# Patient Record
Sex: FEMALE | Race: WHITE | NOT HISPANIC OR LATINO | Employment: FULL TIME | URBAN - METROPOLITAN AREA
[De-identification: names, ages, dates, MRNs, and addresses within clinical notes are randomized per-mention and may not be internally consistent; named-entity substitution may affect disease eponyms.]

---
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
## 2020-01-16 ENCOUNTER — OFFICE VISIT (OUTPATIENT)
Dept: INTERNAL MEDICINE | Facility: CLINIC | Age: 20
End: 2020-01-16
Payer: COMMERCIAL

## 2020-01-16 VITALS
SYSTOLIC BLOOD PRESSURE: 130 MMHG | HEIGHT: 66 IN | WEIGHT: 162 LBS | TEMPERATURE: 98.4 F | DIASTOLIC BLOOD PRESSURE: 82 MMHG | HEART RATE: 92 BPM | RESPIRATION RATE: 18 BRPM | OXYGEN SATURATION: 99 % | BODY MASS INDEX: 26.03 KG/M2

## 2020-01-16 DIAGNOSIS — B00.9 HERPES SIMPLEX VIRUS INFECTION: ICD-10-CM

## 2020-01-16 DIAGNOSIS — F41.9 ANXIETY: ICD-10-CM

## 2020-01-16 DIAGNOSIS — Z30.8 ENCOUNTER FOR OTHER CONTRACEPTIVE MANAGEMENT: ICD-10-CM

## 2020-01-16 DIAGNOSIS — R10.13 EPIGASTRIC PAIN: ICD-10-CM

## 2020-01-16 DIAGNOSIS — K21.9 GASTROESOPHAGEAL REFLUX DISEASE WITHOUT ESOPHAGITIS: ICD-10-CM

## 2020-01-16 DIAGNOSIS — F32.0 MILD MAJOR DEPRESSION (H): ICD-10-CM

## 2020-01-16 DIAGNOSIS — L30.8 OTHER ECZEMA: ICD-10-CM

## 2020-01-16 DIAGNOSIS — Z00.00 ENCOUNTER FOR ROUTINE ADULT HEALTH EXAMINATION WITHOUT ABNORMAL FINDINGS: Primary | ICD-10-CM

## 2020-01-16 LAB
ALBUMIN SERPL-MCNC: 4 G/DL (ref 3.4–5)
ALP SERPL-CCNC: 43 U/L (ref 40–150)
ALT SERPL W P-5'-P-CCNC: 37 U/L (ref 0–50)
ANION GAP SERPL CALCULATED.3IONS-SCNC: 9 MMOL/L (ref 3–14)
AST SERPL W P-5'-P-CCNC: 20 U/L (ref 0–35)
BASOPHILS # BLD AUTO: 0 10E9/L (ref 0–0.2)
BASOPHILS NFR BLD AUTO: 0.6 %
BILIRUB SERPL-MCNC: 0.4 MG/DL (ref 0.2–1.3)
BUN SERPL-MCNC: 10 MG/DL (ref 7–30)
CALCIUM SERPL-MCNC: 9.5 MG/DL (ref 8.5–10.1)
CHLORIDE SERPL-SCNC: 106 MMOL/L (ref 96–110)
CHOLEST SERPL-MCNC: 165 MG/DL
CO2 SERPL-SCNC: 25 MMOL/L (ref 20–32)
CREAT SERPL-MCNC: 0.65 MG/DL (ref 0.5–1)
DEPRECATED CALCIDIOL+CALCIFEROL SERPL-MC: 37 UG/L (ref 20–75)
DIFFERENTIAL METHOD BLD: ABNORMAL
EOSINOPHIL # BLD AUTO: 0.1 10E9/L (ref 0–0.7)
EOSINOPHIL NFR BLD AUTO: 1.6 %
ERYTHROCYTE [DISTWIDTH] IN BLOOD BY AUTOMATED COUNT: 13.1 % (ref 10–15)
GFR SERPL CREATININE-BSD FRML MDRD: >90 ML/MIN/{1.73_M2}
GLUCOSE SERPL-MCNC: 93 MG/DL (ref 70–99)
HCT VFR BLD AUTO: 39.3 % (ref 35–47)
HDLC SERPL-MCNC: 52 MG/DL
HGB BLD-MCNC: 12.2 G/DL (ref 11.7–15.7)
LDLC SERPL CALC-MCNC: 97 MG/DL
LYMPHOCYTES # BLD AUTO: 1.8 10E9/L (ref 0.8–5.3)
LYMPHOCYTES NFR BLD AUTO: 25 %
MCH RBC QN AUTO: 27.4 PG (ref 26.5–33)
MCHC RBC AUTO-ENTMCNC: 31 G/DL (ref 31.5–36.5)
MCV RBC AUTO: 88 FL (ref 78–100)
MONOCYTES # BLD AUTO: 0.8 10E9/L (ref 0–1.3)
MONOCYTES NFR BLD AUTO: 10.9 %
NEUTROPHILS # BLD AUTO: 4.3 10E9/L (ref 1.6–8.3)
NEUTROPHILS NFR BLD AUTO: 61.9 %
NONHDLC SERPL-MCNC: 113 MG/DL
PLATELET # BLD AUTO: 342 10E9/L (ref 150–450)
POTASSIUM SERPL-SCNC: 3.8 MMOL/L (ref 3.4–5.3)
PROT SERPL-MCNC: 7.7 G/DL (ref 6.8–8.8)
RBC # BLD AUTO: 4.45 10E12/L (ref 3.8–5.2)
SODIUM SERPL-SCNC: 140 MMOL/L (ref 133–144)
TRIGL SERPL-MCNC: 78 MG/DL
TSH SERPL DL<=0.005 MIU/L-ACNC: 2.3 MU/L (ref 0.4–4)
WBC # BLD AUTO: 7 10E9/L (ref 4–11)

## 2020-01-16 PROCEDURE — 82306 VITAMIN D 25 HYDROXY: CPT | Performed by: NURSE PRACTITIONER

## 2020-01-16 PROCEDURE — 99385 PREV VISIT NEW AGE 18-39: CPT | Performed by: NURSE PRACTITIONER

## 2020-01-16 PROCEDURE — 80061 LIPID PANEL: CPT | Performed by: NURSE PRACTITIONER

## 2020-01-16 PROCEDURE — 36415 COLL VENOUS BLD VENIPUNCTURE: CPT | Performed by: NURSE PRACTITIONER

## 2020-01-16 PROCEDURE — 80050 GENERAL HEALTH PANEL: CPT | Performed by: NURSE PRACTITIONER

## 2020-01-16 RX ORDER — DESOGESTREL AND ETHINYL ESTRADIOL 0.15-0.03
1 KIT ORAL DAILY
Qty: 84 TABLET | Refills: 3 | Status: SHIPPED | OUTPATIENT
Start: 2020-01-16 | End: 2020-12-18

## 2020-01-16 RX ORDER — VALACYCLOVIR HYDROCHLORIDE 500 MG/1
500 TABLET, FILM COATED ORAL 2 TIMES DAILY
Qty: 6 TABLET | Refills: 4 | Status: SHIPPED | OUTPATIENT
Start: 2020-01-16 | End: 2021-05-10

## 2020-01-16 ASSESSMENT — ENCOUNTER SYMPTOMS
DIZZINESS: 0
DIARRHEA: 0
MYALGIAS: 0
BREAST MASS: 0
WEAKNESS: 0
CHILLS: 0
EYE PAIN: 0
DYSURIA: 0
HEARTBURN: 0
COUGH: 0
CONSTIPATION: 0
FREQUENCY: 0
PARESTHESIAS: 0
HEMATURIA: 0
NERVOUS/ANXIOUS: 1
JOINT SWELLING: 0
HEMATOCHEZIA: 0
ARTHRALGIAS: 0
SHORTNESS OF BREATH: 0
FEVER: 0
SORE THROAT: 1
NAUSEA: 0
PALPITATIONS: 0
ABDOMINAL PAIN: 0
HEADACHES: 1

## 2020-01-16 ASSESSMENT — MIFFLIN-ST. JEOR: SCORE: 1522.61

## 2020-01-16 NOTE — PATIENT INSTRUCTIONS
Lab in [address]    Hydrocortisone 1% to area of rash    Cover with moisturizing cream   VANICREAM or CERAVE    TUMS as needed     Omeprazole once daily for 14 days     Valtrex as needed   Prescription at pharmacy     Prescription for BCP

## 2020-01-16 NOTE — PROGRESS NOTES
SUBJECTIVE:   CC: Yolanda Tenorio is an 19 year old woman who presents for preventive health visit.     Healthy Habits:     Getting at least 3 servings of Calcium per day:  Yes    Bi-annual eye exam:  Yes    Dental care twice a year:  Yes    Sleep apnea or symptoms of sleep apnea:  None    Diet:  Regular (no restrictions)    Frequency of exercise:  1 day/week    Duration of exercise:  15-30 minutes    Taking medications regularly:  Yes    Medication side effects:  None    PHQ-2 Total Score: 2    Additional concerns today:  Yes              Today's PHQ-2 Score:   PHQ-2 ( 1999 Pfizer) 1/16/2020   Q1: Little interest or pleasure in doing things 1   Q2: Feeling down, depressed or hopeless 1   PHQ-2 Score 2   Q1: Little interest or pleasure in doing things Several days   Q2: Feeling down, depressed or hopeless Several days   PHQ-2 Score 2       Abuse: Current or Past(Physical, Sexual or Emotional)- No  Do you feel safe in your environment? Yes        Social History     Tobacco Use     Smoking status: Never Smoker     Smokeless tobacco: Never Used   Substance Use Topics     Alcohol use: No         Alcohol Use 1/16/2020   Prescreen: >3 drinks/day or >7 drinks/week? No   Prescreen: >3 drinks/day or >7 drinks/week? -       Reviewed orders with patient.  Reviewed health maintenance and updated orders accordingly - Yes          Pertinent mammograms are reviewed under the imaging tab.  History of abnormal Pap smear: NO - under age 21, PAP not appropriate for age     Reviewed and updated as needed this visit by clinical staff  Tobacco  Allergies  Meds  Problems  Med Hx  Surg Hx  Fam Hx         Reviewed and updated as needed this visit by Provider  Tobacco  Allergies  Meds  Problems  Med Hx  Surg Hx  Fam Hx            Review of Systems   Constitutional: Negative for chills and fever.   HENT: Negative for congestion, ear pain and hearing loss.    Eyes: Negative for pain.   Respiratory: Negative for cough.   "  Cardiovascular: Negative for chest pain, palpitations and peripheral edema.   Gastrointestinal: Negative for abdominal pain, constipation, diarrhea, heartburn, hematochezia and nausea.   Genitourinary: Negative for dysuria, frequency, genital sores, hematuria, pelvic pain, urgency and vaginal bleeding.   Musculoskeletal: Negative for arthralgias, joint swelling and myalgias.   Neurological: Positive for headaches. Negative for dizziness and paresthesias.   Psychiatric/Behavioral: The patient is nervous/anxious.      Headaches since she was 9 years - 1-2 times a week - sometimes migraines or headaches   Tylenol helps   Sit in dark room - light sensitive, nausea at times     Valtrex for herpes - has not had out break for 2 years    Denies any STD testing     Seeing therapist for anxiety and depression - starting with counselor    Stomach issue     Leg rash - eczema right leg        OBJECTIVE:   /82   Pulse 92   Temp 98.4  F (36.9  C) (Oral)   Resp 18   Ht 1.67 m (5' 5.75\")   Wt 73.5 kg (162 lb)   LMP 01/15/2020 (Exact Date)   SpO2 99%   Breastfeeding No   BMI 26.35 kg/m    Physical Exam  GENERAL:  alert and no distress  EYES: Eyes grossly normal to inspection,  and conjunctivae and sclerae normal  HENT: ear canals and TM's normal, nose and mouth without ulcers or lesions  NECK: no adenopathy, no asymmetry, masses, or scars and thyroid normal to palpation  RESP: lungs clear to auscultation - no rales, rhonchi or wheezes  CV: regular rate and rhythm, normal S1 S2, no S3 or S4, no murmur, click or rub, no peripheral edema and peripheral pulses strong  ABDOMEN: soft, epigastric tender, no hepatosplenomegaly, no masses and bowel sounds normal  MS: no gross musculoskeletal defects noted, no edema  SKIN: circular dry flaky spot right leg   NEURO: Normal strength and tone, mentation intact and speech normal  PSYCH: mentation appears normal, affect normal/bright    Diagnostic Test Results:  Labs reviewed in " "[facility]  Lab     ASSESSMENT/PLAN:   1. Encounter for routine adult health examination without abnormal findings    - CBC with platelets and differential  - Comprehensive metabolic panel  - TSH with free T4 reflex  - Lipid panel reflex to direct LDL Fasting    2. Herpes simplex virus infection  Refill prn - has not had outbreak for 2 years    - valACYclovir (VALTREX) 500 MG tablet; Take 1 tablet (500 mg) by mouth 2 times daily for 3 days  Dispense: 6 tablet; Refill: 4    3. Anxiety  Seeing counselor   Discussed medication and she is not wanting at this time   - CBC with platelets and differential  - Comprehensive metabolic panel  - TSH with free T4 reflex  - Vitamin D Deficiency    4. Mild major depression (H)  As above   - CBC with platelets and differential  - Comprehensive metabolic panel  - TSH with free T4 reflex  - Vitamin D Deficiency    5. Encounter for other contraceptive management  Stable on medication   - desogestrel-ethinyl estradiol (APRI) 0.15-30 MG-MCG tablet; Take 1 tablet by mouth daily  Dispense: 84 tablet; Refill: 3    6. Other eczema  Nummular eczema on right leg     7. Gastroesophageal reflux disease without esophagitis  Omeprazole for 2 weeks and TUMS prn     8. Epigastric pain  As above     Patient Instructions   Lab in [address]    Hydrocortisone 1% to area of rash    Cover with moisturizing cream   VANICREAM or CERAVE    TUMS as needed     Omeprazole once daily for 14 days     Valtrex as needed   Prescription at pharmacy     Prescription for BCP           COUNSELING:  Reviewed preventive health counseling, as reflected in patient instructions       Regular exercise       Healthy diet/nutrition       Osteoporosis Prevention/Bone Health    Estimated body mass index is 26.35 kg/m  as calculated from the following:    Height as of this encounter: 1.67 m (5' 5.75\").    Weight as of this encounter: 73.5 kg (162 lb).         reports that she has never smoked. She has never used smokeless " tobacco.      Counseling Resources:  ATP IV Guidelines  Pooled Cohorts Equation Calculator  Breast Cancer Risk Calculator  FRAX Risk Assessment  ICSI Preventive Guidelines  Dietary Guidelines for Americans, 2010  USDA's MyPlate  ASA Prophylaxis  Lung CA Screening    GEOVANNA Herring [facility]

## 2020-01-16 NOTE — NURSING NOTE
"Chief Complaint   Patient presents with     Physical     initial /82   Pulse 92   Temp 98.4  F (36.9  C) (Oral)   Resp 18   Ht 1.67 m (5' 5.75\")   Wt 73.5 kg (162 lb)   LMP 01/15/2020 (Exact Date)   SpO2 99%   Breastfeeding No   BMI 26.35 kg/m   Estimated body mass index is 26.35 kg/m  as calculated from the following:    Height as of this encounter: 1.67 m (5' 5.75\").    Weight as of this encounter: 73.5 kg (162 lb)..  bp completed using cuff size regular  ZEYAD HAWK LPN  "

## 2020-01-17 DIAGNOSIS — Z30.8 ENCOUNTER FOR OTHER CONTRACEPTIVE MANAGEMENT: ICD-10-CM

## 2020-01-17 RX ORDER — DESOGESTREL AND ETHINYL ESTRADIOL 0.15-0.03
KIT ORAL
Qty: 84 TABLET | Refills: 0 | OUTPATIENT
Start: 2020-01-17

## 2020-11-29 ENCOUNTER — HEALTH MAINTENANCE LETTER (OUTPATIENT)
Age: 20
End: 2020-11-29

## 2020-12-03 ENCOUNTER — MYC MEDICAL ADVICE (OUTPATIENT)
Dept: PEDIATRICS | Facility: CLINIC | Age: 20
End: 2020-12-03

## 2020-12-03 NOTE — TELEPHONE ENCOUNTER
Please advise if you are able to complete paper work for an emotional support animal for patient.  Liza Mohan RN

## 2020-12-04 NOTE — TELEPHONE ENCOUNTER
Usually the person who is treating you, and usually psych would fill out any form  I am not treating her for this so cannot fill out any form   She might want to re discuss with psych

## 2020-12-07 ENCOUNTER — VIRTUAL VISIT (OUTPATIENT)
Dept: INTERNAL MEDICINE | Facility: CLINIC | Age: 20
End: 2020-12-07
Payer: COMMERCIAL

## 2020-12-07 VITALS — WEIGHT: 170 LBS | HEIGHT: 66 IN | BODY MASS INDEX: 27.32 KG/M2

## 2020-12-07 DIAGNOSIS — F32.0 MILD MAJOR DEPRESSION (H): Primary | ICD-10-CM

## 2020-12-07 PROCEDURE — 99207 PR NO BILLABLE SERVICE THIS VISIT: CPT | Mod: 25 | Performed by: INTERNAL MEDICINE

## 2020-12-07 RX ORDER — SERTRALINE HYDROCHLORIDE 100 MG/1
100 TABLET, FILM COATED ORAL DAILY
COMMUNITY
End: 2021-04-26 | Stop reason: SINTOL

## 2020-12-07 ASSESSMENT — MIFFLIN-ST. JEOR: SCORE: 1553.89

## 2020-12-07 NOTE — PROGRESS NOTES
"Yolanda Tenorio is a 20 year old female who is being evaluated via a billable video visit.      The patient has been notified of following:     \"This video visit will be conducted via a call between you and your physician/provider. We have found that certain health care needs can be provided without the need for an in-person physical exam.  This service lets us provide the care you need with a video conversation.  If a prescription is necessary we can send it directly to your pharmacy.  If lab work is needed we can place an order for that and you can then stop by our lab to have the test done at a later time.    Video visits are billed at different rates depending on your insurance coverage.  Please reach out to your insurance provider with any questions.    If during the course of the call the physician/provider feels a video visit is not appropriate, you will not be charged for this service.\"    Patient has given verbal consent for Video visit? Yes  How would you like to obtain your AVS? MyChart  If you are dropped from the video visit, the video invite should be resent to: Text to cell phone: [phone]  Will anyone else be joining your video visit? No    Subjective     Yolanda Tenorio is a 20 year old female who presents today via video visit for the following health issues: patient would like a letter to her apartment property stating her need for an emotional support .    HPI   I called and discussed briefly.     She sees psychiatrist for depression and she was told its against their clinic policy to give the need for emotional support animal letter.    Review of Systems   See above      Objective     Vitals:  No vitals were obtained today due to virtual visit.    Physical Exam   \"GENERAL: Healthy, alert and no distress\",\"EYES: Eyes grossly normal to inspection.  No discharge or erythema, or obvious scleral/conjunctival abnormalities.\",\"RESP: No audible wheeze, cough, or visible cyanosis.  No visible retractions " "or increased work of breathing.  \",\"SKIN: Visible skin clear. No significant rash, abnormal pigmentation or lesions.\",\"NEURO: Cranial nerves grossly intact.  Mentation and speech appropriate for age.\",\"PSYCH: Mentation appears normal, affect normal/bright, judgement and insight intact, normal speech and appearance well-groomed.\"        Assessment & Plan     Yolanda was seen today for letter for school/work.    Diagnoses and all orders for this visit:    Mild major depression (H)    I usually don't give letter like this and get opinion for psychiatrist to decide the need.   She is already seening psychiatrist.  Will not charge for the visit.     Patti Sampson MD  [facility]            "

## 2020-12-17 DIAGNOSIS — Z30.8 ENCOUNTER FOR OTHER CONTRACEPTIVE MANAGEMENT: ICD-10-CM

## 2020-12-18 ENCOUNTER — TRANSFERRED RECORDS (OUTPATIENT)
Dept: HEALTH INFORMATION MANAGEMENT | Facility: CLINIC | Age: 20
End: 2020-12-18

## 2020-12-18 RX ORDER — DESOGESTREL AND ETHINYL ESTRADIOL 0.15-0.03
KIT ORAL
Qty: 84 TABLET | Refills: 3 | Status: SHIPPED | OUTPATIENT
Start: 2020-12-18 | End: 2021-04-26

## 2020-12-18 NOTE — TELEPHONE ENCOUNTER
Prescription approved per [facility] Refill Protocol.  Gavin Berrios RN, BSN

## 2021-01-11 DIAGNOSIS — F32.9 MAJOR DEPRESSION, SINGLE EPISODE: Primary | ICD-10-CM

## 2021-01-20 DIAGNOSIS — F32.9 MAJOR DEPRESSION, SINGLE EPISODE: ICD-10-CM

## 2021-01-20 LAB
BASOPHILS # BLD AUTO: 0 10E9/L (ref 0–0.2)
BASOPHILS NFR BLD AUTO: 0.3 %
DEPRECATED CALCIDIOL+CALCIFEROL SERPL-MC: 33 UG/L (ref 20–75)
DIFFERENTIAL METHOD BLD: NORMAL
EOSINOPHIL # BLD AUTO: 0.1 10E9/L (ref 0–0.7)
EOSINOPHIL NFR BLD AUTO: 1.3 %
ERYTHROCYTE [DISTWIDTH] IN BLOOD BY AUTOMATED COUNT: 13.9 % (ref 10–15)
HCT VFR BLD AUTO: 37.8 % (ref 35–47)
HGB BLD-MCNC: 12 G/DL (ref 11.7–15.7)
LYMPHOCYTES # BLD AUTO: 1.5 10E9/L (ref 0.8–5.3)
LYMPHOCYTES NFR BLD AUTO: 19.2 %
MCH RBC QN AUTO: 28.1 PG (ref 26.5–33)
MCHC RBC AUTO-ENTMCNC: 31.7 G/DL (ref 31.5–36.5)
MCV RBC AUTO: 89 FL (ref 78–100)
MONOCYTES # BLD AUTO: 0.6 10E9/L (ref 0–1.3)
MONOCYTES NFR BLD AUTO: 7 %
NEUTROPHILS # BLD AUTO: 5.7 10E9/L (ref 1.6–8.3)
NEUTROPHILS NFR BLD AUTO: 72.2 %
PLATELET # BLD AUTO: 314 10E9/L (ref 150–450)
RBC # BLD AUTO: 4.27 10E12/L (ref 3.8–5.2)
WBC # BLD AUTO: 7.8 10E9/L (ref 4–11)

## 2021-01-20 PROCEDURE — 84460 ALANINE AMINO (ALT) (SGPT): CPT | Performed by: NURSE PRACTITIONER

## 2021-01-20 PROCEDURE — 82247 BILIRUBIN TOTAL: CPT | Performed by: NURSE PRACTITIONER

## 2021-01-20 PROCEDURE — 36415 COLL VENOUS BLD VENIPUNCTURE: CPT | Performed by: NURSE PRACTITIONER

## 2021-01-20 PROCEDURE — 82248 BILIRUBIN DIRECT: CPT | Performed by: NURSE PRACTITIONER

## 2021-01-20 PROCEDURE — 83550 IRON BINDING TEST: CPT | Performed by: NURSE PRACTITIONER

## 2021-01-20 PROCEDURE — 82306 VITAMIN D 25 HYDROXY: CPT | Performed by: NURSE PRACTITIONER

## 2021-01-20 PROCEDURE — 84155 ASSAY OF PROTEIN SERUM: CPT | Performed by: NURSE PRACTITIONER

## 2021-01-20 PROCEDURE — 84450 TRANSFERASE (AST) (SGOT): CPT | Performed by: NURSE PRACTITIONER

## 2021-01-20 PROCEDURE — 84443 ASSAY THYROID STIM HORMONE: CPT | Performed by: NURSE PRACTITIONER

## 2021-01-20 PROCEDURE — 84075 ASSAY ALKALINE PHOSPHATASE: CPT | Performed by: NURSE PRACTITIONER

## 2021-01-20 PROCEDURE — 83540 ASSAY OF IRON: CPT | Performed by: NURSE PRACTITIONER

## 2021-01-20 PROCEDURE — 85025 COMPLETE CBC W/AUTO DIFF WBC: CPT | Performed by: NURSE PRACTITIONER

## 2021-01-20 PROCEDURE — 80069 RENAL FUNCTION PANEL: CPT | Performed by: NURSE PRACTITIONER

## 2021-01-21 LAB
ALBUMIN SERPL-MCNC: 3.7 G/DL (ref 3.4–5)
ALP SERPL-CCNC: 46 U/L (ref 40–150)
ALT SERPL W P-5'-P-CCNC: 34 U/L (ref 0–50)
ANION GAP SERPL CALCULATED.3IONS-SCNC: 7 MMOL/L (ref 3–14)
AST SERPL W P-5'-P-CCNC: 18 U/L (ref 0–45)
BILIRUB DIRECT SERPL-MCNC: 0.1 MG/DL (ref 0–0.2)
BILIRUB SERPL-MCNC: 0.4 MG/DL (ref 0.2–1.3)
BUN SERPL-MCNC: 12 MG/DL (ref 7–30)
CALCIUM SERPL-MCNC: 8.8 MG/DL (ref 8.5–10.1)
CHLORIDE SERPL-SCNC: 110 MMOL/L (ref 94–109)
CO2 SERPL-SCNC: 24 MMOL/L (ref 20–32)
CREAT SERPL-MCNC: 0.63 MG/DL (ref 0.52–1.04)
GFR SERPL CREATININE-BSD FRML MDRD: >90 ML/MIN/{1.73_M2}
GLUCOSE SERPL-MCNC: 82 MG/DL (ref 70–99)
IRON SATN MFR SERPL: 9 % (ref 15–46)
IRON SERPL-MCNC: 43 UG/DL (ref 35–180)
PHOSPHATE SERPL-MCNC: 3.2 MG/DL (ref 2.5–4.5)
POTASSIUM SERPL-SCNC: 4.2 MMOL/L (ref 3.4–5.3)
PROT SERPL-MCNC: 7.4 G/DL (ref 6.8–8.8)
SODIUM SERPL-SCNC: 141 MMOL/L (ref 133–144)
TIBC SERPL-MCNC: 463 UG/DL (ref 240–430)
TSH SERPL DL<=0.005 MIU/L-ACNC: 1.42 MU/L (ref 0.4–4)

## 2021-04-10 ENCOUNTER — HEALTH MAINTENANCE LETTER (OUTPATIENT)
Age: 21
End: 2021-04-10

## 2021-04-26 ENCOUNTER — OFFICE VISIT (OUTPATIENT)
Dept: INTERNAL MEDICINE | Facility: CLINIC | Age: 21
End: 2021-04-26
Payer: COMMERCIAL

## 2021-04-26 VITALS
TEMPERATURE: 98.1 F | WEIGHT: 177 LBS | BODY MASS INDEX: 28.45 KG/M2 | DIASTOLIC BLOOD PRESSURE: 72 MMHG | HEART RATE: 98 BPM | HEIGHT: 66 IN | RESPIRATION RATE: 14 BRPM | OXYGEN SATURATION: 98 % | SYSTOLIC BLOOD PRESSURE: 128 MMHG

## 2021-04-26 DIAGNOSIS — Z30.8 ENCOUNTER FOR OTHER CONTRACEPTIVE MANAGEMENT: ICD-10-CM

## 2021-04-26 DIAGNOSIS — F32.0 MILD MAJOR DEPRESSION (H): Primary | ICD-10-CM

## 2021-04-26 DIAGNOSIS — Z23 NEED FOR VACCINATION: ICD-10-CM

## 2021-04-26 PROCEDURE — 96127 BRIEF EMOTIONAL/BEHAV ASSMT: CPT | Performed by: NURSE PRACTITIONER

## 2021-04-26 PROCEDURE — 90714 TD VACC NO PRESV 7 YRS+ IM: CPT | Performed by: NURSE PRACTITIONER

## 2021-04-26 PROCEDURE — 99395 PREV VISIT EST AGE 18-39: CPT | Mod: 25 | Performed by: NURSE PRACTITIONER

## 2021-04-26 PROCEDURE — 90471 IMMUNIZATION ADMIN: CPT | Performed by: NURSE PRACTITIONER

## 2021-04-26 RX ORDER — DESOGESTREL AND ETHINYL ESTRADIOL 0.15-0.03
1 KIT ORAL DAILY
Qty: 84 TABLET | Refills: 3 | Status: SHIPPED | OUTPATIENT
Start: 2021-04-26 | End: 2022-01-10

## 2021-04-26 RX ORDER — VENLAFAXINE HYDROCHLORIDE 75 MG/1
75 CAPSULE, EXTENDED RELEASE ORAL DAILY
Qty: 90 CAPSULE | Refills: 1 | Status: SHIPPED | OUTPATIENT
Start: 2021-04-26 | End: 2021-12-16

## 2021-04-26 ASSESSMENT — ENCOUNTER SYMPTOMS
HEADACHES: 1
DIARRHEA: 0
EYE PAIN: 0
FEVER: 0
HEARTBURN: 0
NERVOUS/ANXIOUS: 1
PALPITATIONS: 0
NAUSEA: 0
MYALGIAS: 0
HEMATOCHEZIA: 0
HEMATURIA: 0
PARESTHESIAS: 0
DIZZINESS: 0
SORE THROAT: 0
CHILLS: 0
DYSURIA: 0
CONSTIPATION: 0
BREAST MASS: 0
WEAKNESS: 0
FREQUENCY: 1
JOINT SWELLING: 0
COUGH: 0
ABDOMINAL PAIN: 0
ARTHRALGIAS: 0
SHORTNESS OF BREATH: 0

## 2021-04-26 ASSESSMENT — ANXIETY QUESTIONNAIRES
7. FEELING AFRAID AS IF SOMETHING AWFUL MIGHT HAPPEN: NOT AT ALL
5. BEING SO RESTLESS THAT IT IS HARD TO SIT STILL: NOT AT ALL
2. NOT BEING ABLE TO STOP OR CONTROL WORRYING: MORE THAN HALF THE DAYS
4. TROUBLE RELAXING: MORE THAN HALF THE DAYS
GAD7 TOTAL SCORE: 9
1. FEELING NERVOUS, ANXIOUS, OR ON EDGE: MORE THAN HALF THE DAYS
3. WORRYING TOO MUCH ABOUT DIFFERENT THINGS: MORE THAN HALF THE DAYS
6. BECOMING EASILY ANNOYED OR IRRITABLE: SEVERAL DAYS
7. FEELING AFRAID AS IF SOMETHING AWFUL MIGHT HAPPEN: NOT AT ALL

## 2021-04-26 ASSESSMENT — PATIENT HEALTH QUESTIONNAIRE - PHQ9
10. IF YOU CHECKED OFF ANY PROBLEMS, HOW DIFFICULT HAVE THESE PROBLEMS MADE IT FOR YOU TO DO YOUR WORK, TAKE CARE OF THINGS AT HOME, OR GET ALONG WITH OTHER PEOPLE: SOMEWHAT DIFFICULT
SUM OF ALL RESPONSES TO PHQ QUESTIONS 1-9: 13
SUM OF ALL RESPONSES TO PHQ QUESTIONS 1-9: 13

## 2021-04-26 ASSESSMENT — MIFFLIN-ST. JEOR: SCORE: 1589.62

## 2021-04-26 NOTE — PROGRESS NOTES
"   SUBJECTIVE:   CC: Yolanda Tenorio is an 20 year old woman who presents for preventive health visit.       Patient has been advised of split billing requirements and indicates understanding: Yes  Healthy Habits:     Getting at least 3 servings of Calcium per day:  Yes    Bi-annual eye exam:  Yes    Dental care twice a year:  NO    Sleep apnea or symptoms of sleep apnea:  Daytime drowsiness    Diet:  Regular (no restrictions)    Frequency of exercise:  2-3 days/week    Duration of exercise:  30-45 minutes    Taking medications regularly:  Yes    Medication side effects:  None    PHQ-2 Total Score: 4    Additional concerns today:  No            Depression and Anxiety Follow-Up    How are you doing with your depression since your last visit? Off med x 2 weeks due to \"side effects\" not feeling well    How are you doing with your anxiety since your last visit?  No change    Are you having other symptoms that might be associated with depression or anxiety? No    Have you had a significant life event? No     Do you have any concerns with your use of alcohol or other drugs? No    Social History     Tobacco Use     Smoking status: Never Smoker     Smokeless tobacco: Never Used   Substance Use Topics     Alcohol use: No     Drug use: No     PHQ 4/26/2021   PHQ-9 Total Score 13   Q9: Thoughts of better off dead/self-harm past 2 weeks Not at all     ERWIN-7 SCORE 4/26/2021   Total Score 9 (mild anxiety)   Total Score 9     Last PHQ-9 4/26/2021   1.  Little interest or pleasure in doing things 2   2.  Feeling down, depressed, or hopeless 1   3.  Trouble falling or staying asleep, or sleeping too much 2   4.  Feeling tired or having little energy 3   5.  Poor appetite or overeating 1   6.  Feeling bad about yourself 1   7.  Trouble concentrating 2   8.  Moving slowly or restless 1   Q9: Thoughts of better off dead/self-harm past 2 weeks 0   PHQ-9 Total Score 13     ERWIN-7  4/26/2021   1. Feeling nervous, anxious, or on edge 2   2. Not " being able to stop or control worrying 2   3. Worrying too much about different things 2   4. Trouble relaxing 2   5. Being so restless that it is hard to sit still 0   6. Becoming easily annoyed or irritable 1   7. Feeling afraid, as if something awful might happen 0   ERWIN-7 Total Score 9       Suicide Assessment Five-step Evaluation and Treatment (SAFE-T)      Today's PHQ-2 Score:   PHQ-2 ( 1999 [employer]) 4/26/2021   Q1: Little interest or pleasure in doing things 2   Q2: Feeling down, depressed or hopeless 2   PHQ-2 Score 4   Q1: Little interest or pleasure in doing things More than half the days   Q2: Feeling down, depressed or hopeless More than half the days   PHQ-2 Score 4       Abuse: Current or Past (Physical, Sexual or Emotional) - No  Do you feel safe in your environment? Yes    Have you ever done Advance Care Planning? (For example, a Health Directive, POLST, or a discussion with a medical provider or your loved ones about your wishes): No, advance care planning information given to patient to review.  Patient plans to discuss their wishes with loved ones or provider.      Social History     Tobacco Use     Smoking status: Never Smoker     Smokeless tobacco: Never Used   Substance Use Topics     Alcohol use: No     If you drink alcohol do you typically have >3 drinks per day or >7 drinks per week? No    Alcohol Use 4/26/2021   Prescreen: >3 drinks/day or >7 drinks/week? Not Applicable   Prescreen: >3 drinks/day or >7 drinks/week? -       Reviewed orders with patient.  Reviewed health maintenance and updated orders accordingly - Yes  BP Readings from Last 3 Encounters:   04/26/21 128/72   01/16/20 130/82   12/06/18 122/82    Wt Readings from Last 3 Encounters:   04/26/21 80.3 kg (177 lb)   12/07/20 77.1 kg (170 lb)   01/16/20 73.5 kg (162 lb) (89 %, Z= 1.22)*     * Growth percentiles are based on CDC (Girls, 2-20 Years) data.                  Patient Active Problem List   Diagnosis     Headache     Anxiety      Mild major depression (H)     Encounter for other contraceptive management     Herpes simplex virus infection     No past surgical history on file.    Social History     Tobacco Use     Smoking status: Never Smoker     Smokeless tobacco: Never Used   Substance Use Topics     Alcohol use: No     Family History   Problem Relation Age of Onset     Family History Negative No family hx of          Current Outpatient Medications   Medication Sig Dispense Refill     Ferrous Sulfate (IRON PO) Take by mouth daily       valACYclovir (VALTREX) 1000 mg tablet Take 1 tablet (1,000 mg) by mouth 2 times daily 20 tablet 0     venlafaxine (EFFEXOR-XR) 75 MG 24 hr capsule Take 1 capsule (75 mg) by mouth daily 90 capsule 1     APRI 0.15-30 MG-MCG tablet TAKE 1 TABLET BY MOUTH EVERY DAY (Patient not taking: Reported on 4/26/2021) 84 tablet 3       Breast Cancer Screening:        History of abnormal Pap smear: NO - under age 21, PAP not appropriate for age     Reviewed and updated as needed this visit by clinical staff   Allergies  Meds              Reviewed and updated as needed this visit by Provider                    Review of Systems   Constitutional: Negative for chills and fever.   HENT: Negative for congestion, ear pain, hearing loss and sore throat.    Eyes: Negative for pain and visual disturbance.   Respiratory: Negative for cough and shortness of breath.    Cardiovascular: Negative for chest pain, palpitations and peripheral edema.   Gastrointestinal: Negative for abdominal pain, constipation, diarrhea, heartburn, hematochezia and nausea.   Breasts:  Negative for tenderness, breast mass and discharge.   Genitourinary: Positive for frequency, urgency and vaginal discharge. Negative for dysuria, genital sores, hematuria, pelvic pain and vaginal bleeding.   Musculoskeletal: Negative for arthralgias, joint swelling and myalgias.   Skin: Negative for rash.   Neurological: Positive for headaches. Negative for dizziness,  "weakness and paresthesias.   Psychiatric/Behavioral: Positive for mood changes. The patient is nervous/anxious.           OBJECTIVE:   Pulse 98   Temp 98.1  F (36.7  C) (Oral)   Resp 14   Ht 1.676 m (5' 6\")   Wt 80.3 kg (177 lb)   SpO2 98%   BMI 28.57 kg/m    Physical Exam  GENERAL: healthy, alert and no distress  EYES: Eyes grossly normal to inspection, PERRL and conjunctivae and sclerae normal  NECK: no adenopathy, no asymmetry, masses, or scars and thyroid normal to palpation  RESP: lungs clear to auscultation - no rales, rhonchi or wheezes  CV: regular rate and rhythm, normal S1 S2, no S3 or S4, no murmur, click or rub, no peripheral edema and peripheral pulses strong  ABDOMEN: soft, nontender, no hepatosplenomegaly, no masses and bowel sounds normal  MS: no gross musculoskeletal defects noted, no edema  PSYCH: mentation appears normal, affect normal/bright        ASSESSMENT/PLAN:       ICD-10-CM    1. Mild major depression (H)  F32.0 venlafaxine (EFFEXOR-XR) 75 MG 24 hr capsule     MENTAL HEALTH REFERRAL  - Adult; Psychiatry; Psychiatry and Psychotherapy (Individual/Couple/Family Therapy); Collaborative Care Psychiatry Service and [facility] Counseling [phone]; Yes; Chronic Mental Health without improvement; Ye...   2. Need for vaccination  Z23 TD PRSERV FREE >=7 YRS ADS IM [[id number]]   3. Encounter for other contraceptive management  Z30.8 desogestrel-ethinyl estradiol (APRI) 0.15-30 MG-MCG tablet       Patient has been advised of split billing requirements and indicates understanding: Yes  COUNSELING:  Reviewed preventive health counseling, as reflected in patient instructions    Estimated body mass index is 28.57 kg/m  as calculated from the following:    Height as of this encounter: 1.676 m (5' 6\").    Weight as of this encounter: 80.3 kg (177 lb).        She reports that she has never smoked. She has never used smokeless tobacco.      Counseling Resources:  ATP IV Guidelines  Pooled " Cohorts Equation Calculator  Breast Cancer Risk Calculator  BRCA-Related Cancer Risk Assessment: FHS-7 Tool  FRAX Risk Assessment  ICSI Preventive Guidelines  Dietary Guidelines for Americans, 2010  [employer]'s MyPlate  ASA Prophylaxis  Lung CA Screening    Dariana Claire NP  [facility]  Answers for HPI/ROS submitted by the patient on 4/26/2021   Annual Exam:  If you checked off any problems, how difficult have these problems made it for you to do your work, take care of things at home, or get along with other people?: Somewhat difficult  PHQ9 TOTAL SCORE: 13  ERWIN 7 TOTAL SCORE: 9

## 2021-04-27 ASSESSMENT — ANXIETY QUESTIONNAIRES: GAD7 TOTAL SCORE: 9

## 2021-04-27 ASSESSMENT — PATIENT HEALTH QUESTIONNAIRE - PHQ9: SUM OF ALL RESPONSES TO PHQ QUESTIONS 1-9: 13

## 2021-05-06 DIAGNOSIS — B00.9 HERPES SIMPLEX VIRUS INFECTION: ICD-10-CM

## 2021-05-07 NOTE — TELEPHONE ENCOUNTER
Pending Prescriptions:                       Disp   Refills    valACYclovir (VALTREX) 500 MG tablet [Phar*6 tabl*4        Sig: TAKE 1 TABLET BY MOUTH 2 TIMES DAILY FOR 3 DAYS    Routing refill request to provider for review/approval because:  A break in medication

## 2021-05-10 RX ORDER — VALACYCLOVIR HYDROCHLORIDE 500 MG/1
TABLET, FILM COATED ORAL
Qty: 6 TABLET | Refills: 4 | Status: SHIPPED | OUTPATIENT
Start: 2021-05-10 | End: 2021-12-20

## 2021-09-25 ENCOUNTER — HEALTH MAINTENANCE LETTER (OUTPATIENT)
Age: 21
End: 2021-09-25

## 2021-10-19 PROBLEM — F32.9 MAJOR DEPRESSION: Status: ACTIVE | Noted: 2020-01-16

## 2021-12-11 ENCOUNTER — HOSPITAL ENCOUNTER (EMERGENCY)
Facility: CLINIC | Age: 21
Discharge: HOME | End: 2021-12-11
Attending: EMERGENCY MEDICINE | Admitting: EMERGENCY MEDICINE
Payer: COMMERCIAL

## 2021-12-11 VITALS
TEMPERATURE: 99.6 F | OXYGEN SATURATION: 99 % | DIASTOLIC BLOOD PRESSURE: 107 MMHG | RESPIRATION RATE: 18 BRPM | HEART RATE: 112 BPM | SYSTOLIC BLOOD PRESSURE: 169 MMHG
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
## 2021-12-11 PROCEDURE — 250N000013 HC RX MED GY IP 250 OP 250 PS 637: Performed by: EMERGENCY MEDICINE

## 2021-12-11 PROCEDURE — 12001 RPR S/N/AX/GEN/TRNK 2.5CM/<: CPT

## 2021-12-11 PROCEDURE — 99283 EMERGENCY DEPT VISIT LOW MDM: CPT

## 2021-12-11 RX ORDER — IBUPROFEN 600 MG/1
600 TABLET, FILM COATED ORAL ONCE
Status: COMPLETED | OUTPATIENT
Start: 2021-12-11 | End: 2021-12-11

## 2021-12-11 RX ORDER — LIDOCAINE HYDROCHLORIDE AND EPINEPHRINE 10; 10 MG/ML; UG/ML
INJECTION, SOLUTION INFILTRATION; PERINEURAL
Status: DISCONTINUED
Start: 2021-12-11 | End: 2021-12-11 | Stop reason: HOSPADM

## 2021-12-11 RX ADMIN — AMOXICILLIN AND CLAVULANATE POTASSIUM 1 TABLET: 875; 125 TABLET, FILM COATED ORAL at 20:45

## 2021-12-11 RX ADMIN — IBUPROFEN 600 MG: 600 TABLET ORAL at 20:45

## 2021-12-12 NOTE — ED PROVIDER NOTES
History     Chief Complaint:  Dog Bite       HPI   Yolanda Tenorio is a 21 year old female who presents with dog bite to the left forearm.  Patient states that her own dog bit her just prior to arrival.  No concern for rabies.  Tetanus 4/20/2021.  No weakness of the hand or wrist.  No concern for broken teeth or foreign body.  Area surrounding is sore however no bony pain.    ROS:  Review of Systems   Positive bite wound, negative anticoagulation, negative fever, negative weakness wrist or hand, positive vague numbness however feels sensation is overall intact.    Allergies:  No Known Allergies     Medications:      desogestrel-ethinyl estradiol (APRI) 0.15-30 MG-MCG tablet  Ferrous Sulfate (IRON PO)  valACYclovir (VALTREX) 1000 mg tablet  valACYclovir (VALTREX) 500 MG tablet  venlafaxine (EFFEXOR-XR) 75 MG 24 hr capsule        Past Medical History:    No past medical history on file.  Patient Active Problem List   Diagnosis     Headache     Anxiety     Mild major depression (H)     Encounter for other contraceptive management     Herpes simplex virus infection        Past Surgical History:    No past surgical history on file.     Family History:    family history is not on file.    Social History:   reports that she has never smoked. She has never used smokeless tobacco. She reports that she does not drink alcohol and does not use drugs.  PCP: No Ref-Primary, Physician     Physical Exam     Patient Vitals for the past 24 hrs:   BP Temp Temp src Pulse Resp SpO2   12/11/21 1936 (!) 169/107 99.6  F (37.6  C) Temporal 112 18 99 %        Physical Exam  Gen: alert, answers questions appropriately  CV: left hand warm and well perfused, 2+ radial pulse  Pulm: normal resp effort  MSK: no tenderness over the shoulder, elbow or hand, wrist, contusion and laceration to forearm, full AROM of fingers and elbow  Skin: full thickness laceration to volar forearm, gaping, through subcutaneous tissue to level of muscle belly but no  evidence of muscle violation or deep structure needed.  Neuro: LUE: 5/5 grasp, 5/5 finger opposition, 5/5 finger adduction, 5/5 wrist flexion, 5/5 wrist extension, 5/5 elbow flexion, 5/5 elbow extension, 5/5 shoulder abduction, sensation intact intact to light tough in radial, median and ulnar nerve distributions        Procedures   Laceration repair performed by Cristine Neal MD  The wound to the left forearm was anesthetized with 5 mL of 1% lidocaine with epinephrine.  Following this, the wound was irrigated with pressure with 1 L of normal saline.  The wound was examined through full active range of motion of the naris joint in a bloodless field there is no evidence of deep structure injury.  Wound was closed with 2 interrupted 3-0 Ethilon sutures.  No complications.    Interventions:  Medications   lidocaine 1% with EPINEPHrine 1:100,000 1 %-1:[id number] injection (has no administration in time range)   ibuprofen (ADVIL/MOTRIN) tablet 600 mg (has no administration in time range)   amoxicillin-clavulanate (AUGMENTIN) 875-125 MG per tablet 1 tablet (has no administration in time range)        Disposition:  Home    Medical Decision Making:  Dog bite with gaping wound.  I wound care included I copious irrigation with 1 L of normal saline.  Following this, the wound was examined through full active range of motion of the nurse joint without evidence of deep structure injury.  Significant surrounding contusion however no bony tenderness to suggest fracture.  No signs of foreign body.  Given deep and gaping nature of wound, loosely closed with 2 sutures.  Discussed risks of benefits of closure.  Discussed infection risk given bite wound.  Augmentin for infection prophylaxis.  Wound care instructions and reasons to return the emergency department including increasing redness pain fever discussed in detail with the patient.  Recommended 3-day wound check primary care follow-up in 10 to 14 days for suture  removal.    Diagnosis:    ICD-10-CM    1. Dog bite, initial encounter  W54.0XXA         Discharge Medications:  New Prescriptions    AMOXICILLIN-CLAVULANATE (AUGMENTIN) 875-125 MG TABLET    Take 1 tablet by mouth 2 times daily for 3 days        12/11/2021   Cristine Neal*        Cristine Neal MD  12/11/21 2035

## 2021-12-12 NOTE — DISCHARGE INSTRUCTIONS
Take ibuprofen 600 mg 3x per day.  This will provide both pain control and fight against inflammation.    Return for increasing pain, redness, swelling or fevers.    Take antibiotics as prescribed.

## 2021-12-16 ENCOUNTER — OFFICE VISIT (OUTPATIENT)
Dept: FAMILY MEDICINE | Facility: CLINIC | Age: 21
End: 2021-12-16
Payer: COMMERCIAL

## 2021-12-16 VITALS
SYSTOLIC BLOOD PRESSURE: 122 MMHG | TEMPERATURE: 98.3 F | HEART RATE: 96 BPM | OXYGEN SATURATION: 98 % | WEIGHT: 169.2 LBS | HEIGHT: 66 IN | BODY MASS INDEX: 27.19 KG/M2 | DIASTOLIC BLOOD PRESSURE: 82 MMHG
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
## 2021-12-16 PROCEDURE — 99213 OFFICE O/P EST LOW 20 MIN: CPT | Performed by: NURSE PRACTITIONER

## 2021-12-16 ASSESSMENT — MIFFLIN-ST. JEOR: SCORE: 1549.24

## 2021-12-16 NOTE — PROGRESS NOTES
"  Assessment & Plan     Dog bite, subsequent encounter  Reedy, still looking red and swollen with purulent drainage. Follow up if continued issues.  - amoxicillin-clavulanate (AUGMENTIN) 875-125 MG tablet  Dispense: 20 tablet; Refill: 0       BMI:   Estimated body mass index is 27.31 kg/m  as calculated from the following:    Height as of this encounter: 1.676 m (5' 6\").    Weight as of this encounter: 76.7 kg (169 lb 3.2 oz).       No follow-ups on file.    Renetta Billings CNP  [facility]    Smith Guerrero is a 21 year old who presents for the following health issues  accompanied by her self.    hospitals     ED/UC Followup:    Facility:  [facility] ED  Date of visit: 12/11/2021  Reason for visit: Dog Bite - LEFT Forearm - Possible Infection?   Current Status: Stable           Review of Systems   Constitutional, HEENT, cardiovascular, pulmonary, GI, , musculoskeletal, neuro, skin, endocrine and psych systems are negative, except as otherwise noted.      Objective    /82 (BP Location: Right arm, Patient Position: Sitting)   Pulse 96   Temp 98.3  F (36.8  C) (Tympanic)   Ht 1.676 m (5' 6\")   Wt 76.7 kg (169 lb 3.2 oz)   SpO2 98%   BMI 27.31 kg/m    Body mass index is 27.31 kg/m .  Physical Exam   GENERAL: healthy, alert and no distress  SKIN: dog bite left forearm, closed with 2 sutures. Purulent drainage and surrounding ecchymosis, swelling, erythema. Erythema extends about 2 cm from bite site.               Renetta Billings, SOWMYAP-C     [address] [address]  [email or web address]  [email or web address]   Office: [phone]                 "

## 2021-12-17 ASSESSMENT — ANXIETY QUESTIONNAIRES
IF YOU CHECKED OFF ANY PROBLEMS ON THIS QUESTIONNAIRE, HOW DIFFICULT HAVE THESE PROBLEMS MADE IT FOR YOU TO DO YOUR WORK, TAKE CARE OF THINGS AT HOME, OR GET ALONG WITH OTHER PEOPLE: SOMEWHAT DIFFICULT
6. BECOMING EASILY ANNOYED OR IRRITABLE: NEARLY EVERY DAY
3. WORRYING TOO MUCH ABOUT DIFFERENT THINGS: MORE THAN HALF THE DAYS
GAD7 TOTAL SCORE: 14
7. FEELING AFRAID AS IF SOMETHING AWFUL MIGHT HAPPEN: NOT AT ALL
5. BEING SO RESTLESS THAT IT IS HARD TO SIT STILL: SEVERAL DAYS
1. FEELING NERVOUS, ANXIOUS, OR ON EDGE: NEARLY EVERY DAY
2. NOT BEING ABLE TO STOP OR CONTROL WORRYING: NEARLY EVERY DAY

## 2021-12-17 ASSESSMENT — PATIENT HEALTH QUESTIONNAIRE - PHQ9
SUM OF ALL RESPONSES TO PHQ QUESTIONS 1-9: 12
5. POOR APPETITE OR OVEREATING: MORE THAN HALF THE DAYS

## 2021-12-18 ASSESSMENT — ANXIETY QUESTIONNAIRES: GAD7 TOTAL SCORE: 14

## 2021-12-21 ENCOUNTER — OFFICE VISIT (OUTPATIENT)
Dept: URGENT CARE | Facility: URGENT CARE | Age: 21
End: 2021-12-21
Payer: COMMERCIAL

## 2021-12-21 VITALS
OXYGEN SATURATION: 100 % | DIASTOLIC BLOOD PRESSURE: 74 MMHG | WEIGHT: 165 LBS | TEMPERATURE: 98.9 F | BODY MASS INDEX: 26.63 KG/M2 | SYSTOLIC BLOOD PRESSURE: 102 MMHG | HEART RATE: 85 BPM

## 2021-12-21 DIAGNOSIS — S41.152D DOG BITE OF ARM, LEFT, SUBSEQUENT ENCOUNTER: ICD-10-CM

## 2021-12-21 DIAGNOSIS — S50.12XA TRAUMATIC HEMATOMA OF FOREARM, LEFT, INITIAL ENCOUNTER: Primary | ICD-10-CM
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
## 2021-12-21 PROCEDURE — 99214 OFFICE O/P EST MOD 30 MIN: CPT | Performed by: PHYSICIAN ASSISTANT

## 2021-12-22 NOTE — PROGRESS NOTES
Assessment & Plan     Traumatic hematoma of forearm, left, initial encounter  Patient reassurance.  Swollen area appears consistent with a hematoma today.  Ultrasound of the left upper extremity is ordered and is pending at this time.  Patient will be notified if any abnormal results other than a hematoma.  Patient educational information is provided.  Follow-up sooner if any worsening symptoms.  She agrees with the plan.  - US Upper Extremity Non Vascular Left    Dog bite of arm, left, subsequent encounter  No evidence of ongoing infection.  Laceration site appears to be healing appropriately.  Continue and finish course of Augmentin.  Follow-up if any worsening symptoms.  She agrees with the plan.  - US Upper Extremity Non Vascular Left         Return in about 1 week (around 12/28/2021) for Symptoms failing to improve.    Marylou Etienne PA-C  [facility] [place]    Smith Guerrero is a 21 year old female who presents to clinic today for the following health issues:  Chief Complaint   Patient presents with     Dog Bite     Dog Bite on 12/11/21  hard swollen buldge feels numb, painful when twisting arm and feel uncomfortable, yellow drainage , washing and applying neosporin and taking meds prescribed        HPI    Patient is presenting to urgent care today with a complaint of a large swollen area over the volar aspect of the left forearm. Ongoing symptoms for the past few days.  She sustained a dog bite to the left forearm approximately 10 days ago.  Was seen in the emergency room, laceration was repaired with 2 sutures, was prescribed 3 days of Augmentin for infection prophylaxis.  She followed up with her primary 5 days ago and prescribed a 10 days course of Augmentin as there was concern for infection.  Today she is coming to urgent care as she is concerned that the infection might not be improving. No recent fevers or chills.  No purulent drainage from the affected area.  She reports  discomfort with pronation or supination of the left forearm.       Review of Systems  Constitutional, HEENT, cardiovascular, pulmonary, GI, , musculoskeletal, neuro, skin, endocrine and psych systems are negative, except as otherwise noted.      Objective    /74   Pulse 85   Temp 98.9  F (37.2  C) (Oral)   Wt 74.8 kg (165 lb)   SpO2 100%   BMI 26.63 kg/m    Physical Exam   GENERAL: healthy, alert and no distress  MS: left volar forearm: There is still healing laceration noted, no purulent drainage, no surrounding erythema, skin does not feel warm to the touch, superiorly to the laceration is an indurated area approximately 3 x 4 cm.  There is no erythema.  Skin does not feel hot to the touch.  No significant tenderness to palpation. ROM of the left forearm is normal.

## 2021-12-22 NOTE — PATIENT INSTRUCTIONS
Please call [phone] to schedule left forearm soft tissue ultrasound.      Patient Education     Hematoma  A hematoma is a collection of blood trapped outside of a blood vessel. It is what we think of as a bruise or a contusion. It is usually seen under the skin as a black and blue spot on your arm or leg, or a bump on your head after an injury. It can be almost anywhere on or in your body. It can also occur in an internal organ where it can be more serious.   A hematoma is caused by an injury with damage to small blood vessels. This causes blood to leak into the tissues. Blood forms a pocket under the skin that swells and looks like a purplish patch. Hematomas sometimes form under the skin from bleeding during childbirth and can be particularly serious. Another serious form of hematoma forms after a fall on the head, called a subdural hematoma.   Gradually the blood in the hematoma is absorbed back into the body. The swelling and pain of the hematoma will go away. This takes from 1 to 4 weeks, depending on the size of the hematoma. The skin over the hematoma may turn bluish then brown and yellow as the blood is dissolved and absorbed. Usually, this only takes a couple of weeks but can last months.   Home care    Limit motion of the joints near the hematoma. If the hematoma is large and painful, avoid sports and other vigorous physical activity until the swelling and pain goes away.    Apply an ice pack (ice cubes in a plastic bag, or a frozen bag of peas, wrapped in a thin towel) over the injured area for 20 minutes every 1 to 2 hours the first day. Continue with ice packs 3 to 4 times a day for the next 2 days. Continue the use of ice packs for relief of pain and swelling as needed.    If you need anything for pain, you can take acetaminophen, unless you were given a different pain medicine to use. Talk with your healthcare provider before using this medicine if you have chronic liver or kidney disease. Also  talk with your healthcare provider if you have had a stomach ulcer or digestive tract bleeding, or are taking blood-thinner medicines.    Follow-up care  Follow up with your healthcare provider, or as advised. If X-rays or a CT scan were done, you will be notified if there is a change in the reading, especially if it affects treatment.   When to seek medical advice  Call your healthcare provider right away if any of the following occur:    Redness around the hematoma    Increase in pain or warmth in the hematoma    Increase in size of the hematoma    Fever of 100.4 F (38 C) or higher, or as directed by your healthcare provider    If the hematoma is on the arm or leg, watch for:  ? Increased swelling or pain in the extremity  ? Numbness or tingling or blue color of the hand or foot  Geraldine last reviewed this educational content on 4/1/2018 2000-2021 [employer]. All rights reserved. This information is not intended as a substitute for professional medical care. Always follow your healthcare professional's instructions.

## 2022-01-10 ENCOUNTER — OFFICE VISIT (OUTPATIENT)
Dept: FAMILY MEDICINE | Facility: CLINIC | Age: 22
End: 2022-01-10
Payer: COMMERCIAL

## 2022-01-10 VITALS
HEART RATE: 75 BPM | WEIGHT: 158.5 LBS | HEIGHT: 66 IN | TEMPERATURE: 97.5 F | SYSTOLIC BLOOD PRESSURE: 120 MMHG | DIASTOLIC BLOOD PRESSURE: 82 MMHG | RESPIRATION RATE: 16 BRPM | BODY MASS INDEX: 25.47 KG/M2 | OXYGEN SATURATION: 100 %

## 2022-01-10 DIAGNOSIS — Z11.3 SCREENING FOR STDS (SEXUALLY TRANSMITTED DISEASES): ICD-10-CM

## 2022-01-10 DIAGNOSIS — Z11.59 NEED FOR HEPATITIS C SCREENING TEST: ICD-10-CM

## 2022-01-10 DIAGNOSIS — Z00.00 ROUTINE GENERAL MEDICAL EXAMINATION AT A HEALTH CARE FACILITY: Primary | ICD-10-CM

## 2022-01-10 DIAGNOSIS — Z30.8 ENCOUNTER FOR OTHER CONTRACEPTIVE MANAGEMENT: ICD-10-CM

## 2022-01-10 DIAGNOSIS — F32.0 CURRENT MILD EPISODE OF MAJOR DEPRESSIVE DISORDER WITHOUT PRIOR EPISODE (H): ICD-10-CM

## 2022-01-10 DIAGNOSIS — Z12.4 SCREENING FOR CERVICAL CANCER: ICD-10-CM

## 2022-01-10 LAB
CLUE CELLS: ABNORMAL
TRICHOMONAS, WET PREP: ABNORMAL
WBC'S/HIGH POWER FIELD, WET PREP: ABNORMAL
YEAST, WET PREP: ABNORMAL

## 2022-01-10 PROCEDURE — G0145 SCR C/V CYTO,THINLAYER,RESCR: HCPCS | Performed by: FAMILY MEDICINE

## 2022-01-10 PROCEDURE — 36415 COLL VENOUS BLD VENIPUNCTURE: CPT | Performed by: FAMILY MEDICINE

## 2022-01-10 PROCEDURE — 86780 TREPONEMA PALLIDUM: CPT | Performed by: FAMILY MEDICINE

## 2022-01-10 PROCEDURE — 99395 PREV VISIT EST AGE 18-39: CPT | Performed by: FAMILY MEDICINE

## 2022-01-10 PROCEDURE — 86803 HEPATITIS C AB TEST: CPT | Performed by: FAMILY MEDICINE

## 2022-01-10 PROCEDURE — 87210 SMEAR WET MOUNT SALINE/INK: CPT | Performed by: FAMILY MEDICINE

## 2022-01-10 PROCEDURE — 87491 CHLMYD TRACH DNA AMP PROBE: CPT | Performed by: FAMILY MEDICINE

## 2022-01-10 PROCEDURE — 96127 BRIEF EMOTIONAL/BEHAV ASSMT: CPT | Mod: 59 | Performed by: FAMILY MEDICINE

## 2022-01-10 PROCEDURE — 99214 OFFICE O/P EST MOD 30 MIN: CPT | Mod: 25 | Performed by: FAMILY MEDICINE

## 2022-01-10 PROCEDURE — 87389 HIV-1 AG W/HIV-1&-2 AB AG IA: CPT | Performed by: FAMILY MEDICINE

## 2022-01-10 PROCEDURE — 87591 N.GONORRHOEAE DNA AMP PROB: CPT | Performed by: FAMILY MEDICINE

## 2022-01-10 RX ORDER — DESOGESTREL AND ETHINYL ESTRADIOL 0.15-0.03
1 KIT ORAL DAILY
Qty: 84 TABLET | Refills: 3 | Status: CANCELLED | OUTPATIENT
Start: 2022-01-10

## 2022-01-10 RX ORDER — ESCITALOPRAM OXALATE 5 MG/1
5 TABLET ORAL DAILY
Qty: 30 TABLET | Refills: 1 | Status: SHIPPED | OUTPATIENT
Start: 2022-01-10 | End: 2023-07-14

## 2022-01-10 RX ORDER — ONDANSETRON 4 MG/1
4 TABLET, ORALLY DISINTEGRATING ORAL EVERY 12 HOURS PRN
Qty: 60 TABLET | Refills: 1 | Status: SHIPPED | OUTPATIENT
Start: 2022-01-10 | End: 2023-07-14

## 2022-01-10 SDOH — HEALTH STABILITY: PHYSICAL HEALTH: ON AVERAGE, HOW MANY DAYS PER WEEK DO YOU ENGAGE IN MODERATE TO STRENUOUS EXERCISE (LIKE A BRISK WALK)?: 3 DAYS

## 2022-01-10 SDOH — ECONOMIC STABILITY: FOOD INSECURITY: WITHIN THE PAST 12 MONTHS, YOU WORRIED THAT YOUR FOOD WOULD RUN OUT BEFORE YOU GOT MONEY TO BUY MORE.: NEVER TRUE

## 2022-01-10 SDOH — ECONOMIC STABILITY: TRANSPORTATION INSECURITY
IN THE PAST 12 MONTHS, HAS LACK OF TRANSPORTATION KEPT YOU FROM MEETINGS, WORK, OR FROM GETTING THINGS NEEDED FOR DAILY LIVING?: NO

## 2022-01-10 SDOH — HEALTH STABILITY: PHYSICAL HEALTH: ON AVERAGE, HOW MANY MINUTES DO YOU ENGAGE IN EXERCISE AT THIS LEVEL?: 30 MIN

## 2022-01-10 SDOH — ECONOMIC STABILITY: TRANSPORTATION INSECURITY
IN THE PAST 12 MONTHS, HAS THE LACK OF TRANSPORTATION KEPT YOU FROM MEDICAL APPOINTMENTS OR FROM GETTING MEDICATIONS?: NO

## 2022-01-10 SDOH — ECONOMIC STABILITY: FOOD INSECURITY: WITHIN THE PAST 12 MONTHS, THE FOOD YOU BOUGHT JUST DIDN'T LAST AND YOU DIDN'T HAVE MONEY TO GET MORE.: NEVER TRUE

## 2022-01-10 SDOH — ECONOMIC STABILITY: INCOME INSECURITY: HOW HARD IS IT FOR YOU TO PAY FOR THE VERY BASICS LIKE FOOD, HOUSING, MEDICAL CARE, AND HEATING?: NOT VERY HARD

## 2022-01-10 SDOH — ECONOMIC STABILITY: INCOME INSECURITY: IN THE LAST 12 MONTHS, WAS THERE A TIME WHEN YOU WERE NOT ABLE TO PAY THE MORTGAGE OR RENT ON TIME?: NO

## 2022-01-10 ASSESSMENT — SOCIAL DETERMINANTS OF HEALTH (SDOH)
IN A TYPICAL WEEK, HOW MANY TIMES DO YOU TALK ON THE PHONE WITH FAMILY, FRIENDS, OR NEIGHBORS?: THREE TIMES A WEEK
ARE YOU MARRIED, WIDOWED, DIVORCED, SEPARATED, NEVER MARRIED, OR LIVING WITH A PARTNER?: NEVER MARRIED
DO YOU BELONG TO ANY CLUBS OR ORGANIZATIONS SUCH AS CHURCH GROUPS UNIONS, FRATERNAL OR ATHLETIC GROUPS, OR SCHOOL GROUPS?: NO
HOW OFTEN DO YOU GET TOGETHER WITH FRIENDS OR RELATIVES?: PATIENT DECLINED
HOW OFTEN DO YOU ATTEND CHURCH OR RELIGIOUS SERVICES?: 1 TO 4 TIMES PER YEAR

## 2022-01-10 ASSESSMENT — LIFESTYLE VARIABLES
HOW OFTEN DO YOU HAVE SIX OR MORE DRINKS ON ONE OCCASION: LESS THAN MONTHLY
HOW MANY STANDARD DRINKS CONTAINING ALCOHOL DO YOU HAVE ON A TYPICAL DAY: 1 OR 2
HOW OFTEN DO YOU HAVE A DRINK CONTAINING ALCOHOL: 2-4 TIMES A MONTH

## 2022-01-10 ASSESSMENT — ENCOUNTER SYMPTOMS
WEAKNESS: 0
MYALGIAS: 0
PARESTHESIAS: 0
ABDOMINAL PAIN: 0
EYE PAIN: 0
HEMATOCHEZIA: 0
HEARTBURN: 0
HEADACHES: 1
DIZZINESS: 0
FREQUENCY: 0
SORE THROAT: 0
HEMATURIA: 0
BREAST MASS: 0
DYSURIA: 0
JOINT SWELLING: 0
SHORTNESS OF BREATH: 0
DIARRHEA: 0
COUGH: 0
CONSTIPATION: 0
ARTHRALGIAS: 0
CHILLS: 0
NERVOUS/ANXIOUS: 1
FEVER: 0
NAUSEA: 0
PALPITATIONS: 0

## 2022-01-10 ASSESSMENT — PATIENT HEALTH QUESTIONNAIRE - PHQ9
SUM OF ALL RESPONSES TO PHQ QUESTIONS 1-9: 14
10. IF YOU CHECKED OFF ANY PROBLEMS, HOW DIFFICULT HAVE THESE PROBLEMS MADE IT FOR YOU TO DO YOUR WORK, TAKE CARE OF THINGS AT HOME, OR GET ALONG WITH OTHER PEOPLE: SOMEWHAT DIFFICULT
SUM OF ALL RESPONSES TO PHQ QUESTIONS 1-9: 14

## 2022-01-10 ASSESSMENT — MIFFLIN-ST. JEOR: SCORE: 1500.7

## 2022-01-10 NOTE — PROGRESS NOTES
SUBJECTIVE:   CC: Yolanda Tenorio is an 21 year old woman who presents for preventive health visit.       Patient has been advised of split billing requirements and indicates understanding: Yes     Healthy Habits:     Getting at least 3 servings of Calcium per day:  Yes    Bi-annual eye exam:  Yes    Dental care twice a year:  NO    Sleep apnea or symptoms of sleep apnea:  Daytime drowsiness    Diet:  Regular (no restrictions)    Frequency of exercise:  1 day/week    Duration of exercise:  15-30 minutes    Taking medications regularly:  Yes    Medication side effects:  Not applicable    PHQ-2 Total Score: 5    Additional concerns today:  Yes      Would like STD screening. Ended a 1 year relationship recently.     Stopped using her OCP to see if this would help with her MH symptoms. Has not.   Has been on medications to help with depression and anxiety in the past, often had GI side effects - led her to discontinue med.       Today's PHQ-2 Score:   PHQ-2 ( 1999 [employer]) 1/10/2022   Q1: Little interest or pleasure in doing things 2   Q2: Feeling down, depressed or hopeless 3   PHQ-2 Score 5   PHQ-2 Total Score (12-17 Years)- Positive if 3 or more points; Administer PHQ-A if positive -   Q1: Little interest or pleasure in doing things More than half the days   Q2: Feeling down, depressed or hopeless Nearly every day   PHQ-2 Score 5       Abuse: Current or Past (Physical, Sexual or Emotional) - No  Do you feel safe in your environment? Yes        Social History     Tobacco Use     Smoking status: Never Smoker     Smokeless tobacco: Never Used   Substance Use Topics     Alcohol use: No         Alcohol Use 1/10/2022   Prescreen: >3 drinks/day or >7 drinks/week? No   Prescreen: >3 drinks/day or >7 drinks/week? -       Reviewed orders with patient.  Reviewed health maintenance and updated orders accordingly - Yes  Patient Active Problem List   Diagnosis     Headache     Anxiety     Mild major depression (H)     Encounter for  "other contraceptive management     Herpes simplex virus infection     History reviewed. No pertinent surgical history.    Social History     Tobacco Use     Smoking status: Never Smoker     Smokeless tobacco: Never Used   Substance Use Topics     Alcohol use: No     Family History   Problem Relation Age of Onset     Family History Negative No family hx of            Breast Cancer Screening: not needed        History of abnormal Pap smear: NO - age 21-29 PAP every 3 years recommended     Reviewed and updated as needed this visit by clinical staff  Tobacco  Allergies  Meds   Med Hx  Surg Hx  Fam Hx  Soc Hx       Reviewed and updated as needed this visit by Provider                   Review of Systems   Constitutional: Negative for chills and fever.   HENT: Negative for congestion, ear pain, hearing loss and sore throat.    Eyes: Negative for pain and visual disturbance.   Respiratory: Negative for cough and shortness of breath.    Cardiovascular: Negative for chest pain, palpitations and peripheral edema.   Gastrointestinal: Negative for abdominal pain, constipation, diarrhea, heartburn, hematochezia and nausea.   Breasts:  Negative for tenderness, breast mass and discharge.   Genitourinary: Positive for urgency. Negative for dysuria, frequency, genital sores, hematuria, pelvic pain, vaginal bleeding and vaginal discharge.   Musculoskeletal: Negative for arthralgias, joint swelling and myalgias.   Skin: Negative for rash.   Neurological: Positive for headaches. Negative for dizziness, weakness and paresthesias.   Psychiatric/Behavioral: Positive for mood changes. The patient is nervous/anxious.         OBJECTIVE:   /82   Pulse 75   Temp 97.5  F (36.4  C) (Oral)   Resp 16   Ht 1.676 m (5' 6\")   Wt 71.9 kg (158 lb 8 oz)   LMP 01/02/2022 (Approximate)   SpO2 100%   BMI 25.58 kg/m    Physical Exam  GENERAL: healthy, alert and no distress  EYES: Eyes grossly normal to inspection, PERRL and conjunctivae " and sclerae normal  HENT: ear canals and TM's normal, nose and mouth without ulcers or lesions  NECK: no adenopathy, no asymmetry, masses, or scars and thyroid normal to palpation  RESP: lungs clear to auscultation - no rales, rhonchi or wheezes  BREAST: normal without masses, tenderness or nipple discharge and no palpable axillary masses or adenopathy  CV: regular rate and rhythm, normal S1 S2, no S3 or S4, no murmur, click or rub, no peripheral edema and peripheral pulses strong  ABDOMEN: soft, nontender, no hepatosplenomegaly, no masses and bowel sounds normal   (female): normal female external genitalia, normal urethral meatus, vaginal mucosa pink, moist, well rugated, and normal cervix/adnexa/uterus without masses or discharge  MS: no gross musculoskeletal defects noted, no edema  SKIN: no suspicious lesions or rashes  NEURO: Normal strength and tone, mentation intact and speech normal  PSYCH: mentation appears normal, affect normal/bright    Diagnostic Test Results:  Labs reviewed in [facility]    ERWIN-7 SCORE 4/26/2021 12/17/2021   Total Score 9 (mild anxiety) -   Total Score 9 14       PHQ 4/26/2021 12/17/2021 1/10/2022   PHQ-9 Total Score 13 12 14   Q9: Thoughts of better off dead/self-harm past 2 weeks Not at all Not at all Not at all       ASSESSMENT/PLAN:     1. Routine general medical examination at a health care facility    2. Screening for STDs (sexually transmitted diseases)  - NEISSERIA GONORRHOEA PCR; Future  - CHLAMYDIA TRACHOMATIS PCR; Future  - HIV Antigen Antibody Combo; Future  - Treponema Abs w Reflex to RPR and Titer; Future  - Wet prep - Clinic Collect    3. Need for hepatitis C screening test  - Hepatitis C Screen Reflex to HCV RNA Quant and Genotype; Future    4. Encounter for other contraceptive management - will hold off on birth control at the moment - don't want hormones to interfere with her MH symptoms.     5. Current mild episode of major depressive disorder without prior episode (H) -  "will start selective serotonin reuptake inhibitor, she will work on finding a new therapist. To help her avoid GI side effects, have sent zofran in to take with selective serotonin reuptake inhibitor. Can hopefully stop anti-emetic after 6 weeks.   - escitalopram (LEXAPRO) 5 MG tablet; Take 1 tablet (5 mg) by mouth daily  Dispense: 30 tablet; Refill: 1  - ondansetron (ZOFRAN-ODT) 4 MG ODT tab; Take 1 tablet (4 mg) by mouth every 12 hours as needed for nausea  Dispense: 60 tablet; Refill: 1    6. Screening for cervical cancer -   - Pap Screen only - recommended age 21 - 24 years; Future      Patient has been advised of split billing requirements and indicates understanding: Yes  COUNSELING:  Reviewed preventive health counseling, as reflected in patient instructions    Estimated body mass index is 25.58 kg/m  as calculated from the following:    Height as of this encounter: 1.676 m (5' 6\").    Weight as of this encounter: 71.9 kg (158 lb 8 oz).      She reports that she has never smoked. She has never used smokeless tobacco.      Kelly Ledbetter MD  [facility]    Answers for HPI/ROS submitted by the patient on 1/10/2022  If you checked off any problems, how difficult have these problems made it for you to do your work, take care of things at home, or get along with other people?: Somewhat difficult  PHQ9 TOTAL SCORE: 14      "

## 2022-01-11 LAB
HCV AB SERPL QL IA: NONREACTIVE
HIV 1+2 AB+HIV1 P24 AG SERPL QL IA: NONREACTIVE
T PALLIDUM AB SER QL: NONREACTIVE

## 2022-01-11 ASSESSMENT — PATIENT HEALTH QUESTIONNAIRE - PHQ9: SUM OF ALL RESPONSES TO PHQ QUESTIONS 1-9: 14

## 2022-01-12 LAB
C TRACH DNA SPEC QL NAA+PROBE: NEGATIVE
N GONORRHOEA DNA SPEC QL NAA+PROBE: NEGATIVE

## 2022-01-13 LAB
BKR LAB AP GYN ADEQUACY: NORMAL
BKR LAB AP GYN INTERPRETATION: NORMAL
BKR LAB AP HPV REFLEX: NO
BKR LAB AP PREVIOUS ABNORMAL: NORMAL
PATH REPORT.COMMENTS IMP SPEC: NORMAL
PATH REPORT.COMMENTS IMP SPEC: NORMAL
PATH REPORT.RELEVANT HX SPEC: NORMAL

## 2022-02-07 DIAGNOSIS — F32.0 CURRENT MILD EPISODE OF MAJOR DEPRESSIVE DISORDER WITHOUT PRIOR EPISODE (H): ICD-10-CM

## 2022-02-09 RX ORDER — ESCITALOPRAM OXALATE 5 MG/1
5 TABLET ORAL DAILY
Qty: 30 TABLET | Refills: 1 | OUTPATIENT
Start: 2022-02-09

## 2022-12-26 ENCOUNTER — HEALTH MAINTENANCE LETTER (OUTPATIENT)
Age: 22
End: 2022-12-26

## 2023-04-16 ENCOUNTER — HEALTH MAINTENANCE LETTER (OUTPATIENT)
Age: 23
End: 2023-04-16

## 2023-05-19 ENCOUNTER — TELEPHONE (OUTPATIENT)
Dept: FAMILY MEDICINE | Facility: CLINIC | Age: 23
End: 2023-05-19
Payer: COMMERCIAL

## 2023-05-19 NOTE — TELEPHONE ENCOUNTER
Summary:    Patient is due/failing the following:   PHQ9    Reviewed:    [] CARE EVERYWHERE  [] LAST OV NOTE   [] FYI TAB  [] MYCHART ACTIVE?  [] LAST PANEL ENCOUNTER  [] FUTURE APPTS  [] IMMUNIZATIONS  [] Media Tab            Action needed:   Patient needs to do PHQ9.    Type of outreach:    Sent MyChart message.                                                                               Carey Chavez/KAMRON  [place]---[facility]

## 2023-07-11 ASSESSMENT — ENCOUNTER SYMPTOMS
HEMATURIA: 0
BREAST MASS: 0
HEADACHES: 1
PALPITATIONS: 0
CONSTIPATION: 0
DIARRHEA: 0
CHILLS: 0
SORE THROAT: 0
MYALGIAS: 0
FEVER: 0
PARESTHESIAS: 0
ABDOMINAL PAIN: 0
NERVOUS/ANXIOUS: 1
DYSURIA: 0
DIZZINESS: 0
HEMATOCHEZIA: 0
COUGH: 0
NAUSEA: 0
FREQUENCY: 1
SHORTNESS OF BREATH: 0
ARTHRALGIAS: 0
JOINT SWELLING: 0
EYE PAIN: 0
WEAKNESS: 0
HEARTBURN: 0

## 2023-07-14 ENCOUNTER — OFFICE VISIT (OUTPATIENT)
Dept: FAMILY MEDICINE | Facility: CLINIC | Age: 23
End: 2023-07-14
Payer: COMMERCIAL

## 2023-07-14 VITALS
RESPIRATION RATE: 16 BRPM | WEIGHT: 150 LBS | SYSTOLIC BLOOD PRESSURE: 126 MMHG | OXYGEN SATURATION: 100 % | HEIGHT: 66 IN | TEMPERATURE: 97.3 F | BODY MASS INDEX: 24.11 KG/M2 | DIASTOLIC BLOOD PRESSURE: 84 MMHG | HEART RATE: 84 BPM

## 2023-07-14 DIAGNOSIS — Z00.00 ROUTINE GENERAL MEDICAL EXAMINATION AT A HEALTH CARE FACILITY: Primary | ICD-10-CM

## 2023-07-14 DIAGNOSIS — G43.109 MIGRAINE WITH AURA AND WITHOUT STATUS MIGRAINOSUS, NOT INTRACTABLE: ICD-10-CM

## 2023-07-14 DIAGNOSIS — F32.0 CURRENT MILD EPISODE OF MAJOR DEPRESSIVE DISORDER WITHOUT PRIOR EPISODE (H): ICD-10-CM

## 2023-07-14 DIAGNOSIS — Z13.220 SCREENING CHOLESTEROL LEVEL: ICD-10-CM

## 2023-07-14 LAB
ALBUMIN SERPL BCG-MCNC: 4.5 G/DL (ref 3.5–5.2)
ALP SERPL-CCNC: 30 U/L (ref 35–104)
ALT SERPL W P-5'-P-CCNC: 14 U/L (ref 0–50)
ANION GAP SERPL CALCULATED.3IONS-SCNC: 11 MMOL/L (ref 7–15)
AST SERPL W P-5'-P-CCNC: 26 U/L (ref 0–45)
BILIRUB SERPL-MCNC: 0.5 MG/DL
BUN SERPL-MCNC: 15.3 MG/DL (ref 6–20)
CALCIUM SERPL-MCNC: 9.6 MG/DL (ref 8.6–10)
CHLORIDE SERPL-SCNC: 104 MMOL/L (ref 98–107)
CHOLEST SERPL-MCNC: 148 MG/DL
CREAT SERPL-MCNC: 0.65 MG/DL (ref 0.51–0.95)
DEPRECATED HCO3 PLAS-SCNC: 24 MMOL/L (ref 22–29)
ERYTHROCYTE [DISTWIDTH] IN BLOOD BY AUTOMATED COUNT: 13.2 % (ref 10–15)
GFR SERPL CREATININE-BSD FRML MDRD: >90 ML/MIN/1.73M2
GLUCOSE SERPL-MCNC: 78 MG/DL (ref 70–99)
HCT VFR BLD AUTO: 36.9 % (ref 35–47)
HDLC SERPL-MCNC: 59 MG/DL
HGB BLD-MCNC: 12 G/DL (ref 11.7–15.7)
LDLC SERPL CALC-MCNC: 72 MG/DL
MCH RBC QN AUTO: 29.3 PG (ref 26.5–33)
MCHC RBC AUTO-ENTMCNC: 32.5 G/DL (ref 31.5–36.5)
MCV RBC AUTO: 90 FL (ref 78–100)
NONHDLC SERPL-MCNC: 89 MG/DL
PLATELET # BLD AUTO: 253 10E3/UL (ref 150–450)
POTASSIUM SERPL-SCNC: 4.7 MMOL/L (ref 3.4–5.3)
PROT SERPL-MCNC: 7.1 G/DL (ref 6.4–8.3)
RBC # BLD AUTO: 4.09 10E6/UL (ref 3.8–5.2)
SODIUM SERPL-SCNC: 139 MMOL/L (ref 136–145)
TRIGL SERPL-MCNC: 86 MG/DL
TSH SERPL DL<=0.005 MIU/L-ACNC: 1.39 UIU/ML (ref 0.3–4.2)
VIT B12 SERPL-MCNC: 563 PG/ML (ref 232–1245)
WBC # BLD AUTO: 5.4 10E3/UL (ref 4–11)

## 2023-07-14 PROCEDURE — 80053 COMPREHEN METABOLIC PANEL: CPT | Performed by: NURSE PRACTITIONER

## 2023-07-14 PROCEDURE — 84443 ASSAY THYROID STIM HORMONE: CPT | Performed by: NURSE PRACTITIONER

## 2023-07-14 PROCEDURE — 85027 COMPLETE CBC AUTOMATED: CPT | Performed by: NURSE PRACTITIONER

## 2023-07-14 PROCEDURE — 99395 PREV VISIT EST AGE 18-39: CPT | Performed by: NURSE PRACTITIONER

## 2023-07-14 PROCEDURE — 82306 VITAMIN D 25 HYDROXY: CPT | Performed by: NURSE PRACTITIONER

## 2023-07-14 PROCEDURE — 99214 OFFICE O/P EST MOD 30 MIN: CPT | Mod: 25 | Performed by: NURSE PRACTITIONER

## 2023-07-14 PROCEDURE — 80061 LIPID PANEL: CPT | Performed by: NURSE PRACTITIONER

## 2023-07-14 PROCEDURE — 36415 COLL VENOUS BLD VENIPUNCTURE: CPT | Performed by: NURSE PRACTITIONER

## 2023-07-14 PROCEDURE — 82607 VITAMIN B-12: CPT | Performed by: NURSE PRACTITIONER

## 2023-07-14 RX ORDER — ZOLMITRIPTAN 2.5 MG/1
2.5 TABLET, FILM COATED ORAL
Qty: 20 TABLET | Refills: 3 | Status: SHIPPED | OUTPATIENT
Start: 2023-07-14 | End: 2023-11-03

## 2023-07-14 RX ORDER — NORGESTIMATE AND ETHINYL ESTRADIOL 0.25-0.035
KIT ORAL
COMMUNITY
Start: 2023-05-16 | End: 2023-10-17

## 2023-07-14 ASSESSMENT — ANXIETY QUESTIONNAIRES
7. FEELING AFRAID AS IF SOMETHING AWFUL MIGHT HAPPEN: SEVERAL DAYS
5. BEING SO RESTLESS THAT IT IS HARD TO SIT STILL: MORE THAN HALF THE DAYS
IF YOU CHECKED OFF ANY PROBLEMS ON THIS QUESTIONNAIRE, HOW DIFFICULT HAVE THESE PROBLEMS MADE IT FOR YOU TO DO YOUR WORK, TAKE CARE OF THINGS AT HOME, OR GET ALONG WITH OTHER PEOPLE: SOMEWHAT DIFFICULT
GAD7 TOTAL SCORE: 12
GAD7 TOTAL SCORE: 12
2. NOT BEING ABLE TO STOP OR CONTROL WORRYING: MORE THAN HALF THE DAYS
6. BECOMING EASILY ANNOYED OR IRRITABLE: MORE THAN HALF THE DAYS
4. TROUBLE RELAXING: MORE THAN HALF THE DAYS
1. FEELING NERVOUS, ANXIOUS, OR ON EDGE: SEVERAL DAYS
3. WORRYING TOO MUCH ABOUT DIFFERENT THINGS: MORE THAN HALF THE DAYS

## 2023-07-14 ASSESSMENT — ENCOUNTER SYMPTOMS
NERVOUS/ANXIOUS: 1
JOINT SWELLING: 0
MYALGIAS: 0
COUGH: 0
SHORTNESS OF BREATH: 0
WEAKNESS: 0
CONSTIPATION: 0
SORE THROAT: 0
EYE PAIN: 0
BREAST MASS: 0
NAUSEA: 0
HEMATOCHEZIA: 0
HEMATURIA: 0
FREQUENCY: 1
CHILLS: 0
ABDOMINAL PAIN: 0
HEARTBURN: 0
DYSURIA: 0
FEVER: 0
DIZZINESS: 0
DIARRHEA: 0
HEADACHES: 1
PARESTHESIAS: 0
ARTHRALGIAS: 0
PALPITATIONS: 0

## 2023-07-14 ASSESSMENT — PATIENT HEALTH QUESTIONNAIRE - PHQ9
SUM OF ALL RESPONSES TO PHQ QUESTIONS 1-9: 15
10. IF YOU CHECKED OFF ANY PROBLEMS, HOW DIFFICULT HAVE THESE PROBLEMS MADE IT FOR YOU TO DO YOUR WORK, TAKE CARE OF THINGS AT HOME, OR GET ALONG WITH OTHER PEOPLE: SOMEWHAT DIFFICULT
SUM OF ALL RESPONSES TO PHQ QUESTIONS 1-9: 15

## 2023-07-14 NOTE — PROGRESS NOTES
SUBJECTIVE:   CC: Yolanda is an 22 year old who presents for preventive health visit.       7/14/2023    10:38 AM   Additional Questions   Roomed by Suni BARTLETT CMA     Healthy Habits:     Getting at least 3 servings of Calcium per day:  Yes    Bi-annual eye exam:  Yes    Dental care twice a year:  Yes    Sleep apnea or symptoms of sleep apnea:  Daytime drowsiness    Diet:  Other    Frequency of exercise:  4-5 days/week    Duration of exercise:  Greater than 60 minutes    Taking medications regularly:  No    Barriers to taking medications:  None    Medication side effects:  Not applicable    Additional concerns today:  Yes    Headaches multiple times per week. Will use tylenol or ibuprofen and not always helpful.    Will get migraines. Feels foggy at first and eventually gets headache most of the time. Develops over time, severe once present with nausea and blurred vision.    Vision has been checked.     Seeing therapist for mood.      Today's PHQ-9 Score:       7/14/2023    10:26 AM   PHQ-9 SCORE   PHQ-9 Total Score MyChart 15 (Moderately severe depression)   PHQ-9 Total Score 15       Depression and Anxiety Follow-Up    How are you doing with your depression since your last visit? No change    How are you doing with your anxiety since your last visit?  No change    Are you having other symptoms that might be associated with depression or anxiety? No    Have you had a significant life event? No     Do you have any concerns with your use of alcohol or other drugs? No    Social History     Tobacco Use     Smoking status: Never     Smokeless tobacco: Never   Vaping Use     Vaping Use: Never used   Substance Use Topics     Alcohol use: No     Drug use: No         12/17/2021    10:58 AM 1/10/2022    10:31 AM 7/14/2023    10:26 AM   PHQ   PHQ-9 Total Score 12 14 15   Q9: Thoughts of better off dead/self-harm past 2 weeks Not at all Not at all Not at all         4/26/2021    11:02 AM 12/17/2021    10:58 AM 7/14/2023    10:26  AM   ERWIN-7 SCORE   Total Score 9 (mild anxiety)  12 (moderate anxiety)   Total Score 9 14 12         7/14/2023    10:26 AM   Last PHQ-9   1.  Little interest or pleasure in doing things 1   2.  Feeling down, depressed, or hopeless 1   3.  Trouble falling or staying asleep, or sleeping too much 3   4.  Feeling tired or having little energy 3   5.  Poor appetite or overeating 2   6.  Feeling bad about yourself 0   7.  Trouble concentrating 2   8.  Moving slowly or restless 3   Q9: Thoughts of better off dead/self-harm past 2 weeks 0   PHQ-9 Total Score 15         7/14/2023    10:26 AM   ERWIN-7    1. Feeling nervous, anxious, or on edge 1   2. Not being able to stop or control worrying 2   3. Worrying too much about different things 2   4. Trouble relaxing 2   5. Being so restless that it is hard to sit still 2   6. Becoming easily annoyed or irritable 2   7. Feeling afraid, as if something awful might happen 1   ERWIN-7 Total Score 12   If you checked any problems, how difficult have they made it for you to do your work, take care of things at home, or get along with other people? Somewhat difficult       Suicide Assessment Five-step Evaluation and Treatment (SAFE-T)        Social History     Tobacco Use     Smoking status: Never     Smokeless tobacco: Never   Substance Use Topics     Alcohol use: No             7/11/2023     9:29 AM   Alcohol Use   Prescreen: >3 drinks/day or >7 drinks/week? No     Reviewed orders with patient.  Reviewed health maintenance and updated orders accordingly - Yes  Lab work is in process    Breast Cancer Screening:        History of abnormal Pap smear: NO - age 21-29 PAP every 3 years recommended      1/10/2022     3:35 PM   PAP / HPV   PAP Negative for Intraepithelial Lesion or Malignancy (NILM)      Reviewed and updated as needed this visit by clinical staff   Tobacco  Allergies  Meds  Problems  Med Hx  Surg Hx  Fam Hx          Reviewed and updated as needed this visit by Provider    "              History reviewed. No pertinent past medical history.   History reviewed. No pertinent surgical history.    Review of Systems   Constitutional: Negative for chills and fever.   HENT: Negative for congestion, ear pain, hearing loss and sore throat.    Eyes: Negative for pain and visual disturbance.   Respiratory: Negative for cough and shortness of breath.    Cardiovascular: Negative for chest pain, palpitations and peripheral edema.   Gastrointestinal: Negative for abdominal pain, constipation, diarrhea, heartburn, hematochezia and nausea.   Breasts:  Negative for tenderness, breast mass and discharge.   Genitourinary: Positive for frequency. Negative for dysuria, genital sores, hematuria, pelvic pain, urgency, vaginal bleeding and vaginal discharge.   Musculoskeletal: Negative for arthralgias, joint swelling and myalgias.   Skin: Negative for rash.   Neurological: Positive for headaches. Negative for dizziness, weakness and paresthesias.   Psychiatric/Behavioral: Negative for mood changes. The patient is nervous/anxious.         OBJECTIVE:   /84   Pulse 84   Temp 97.3  F (36.3  C) (Tympanic)   Resp 16   Ht 1.676 m (5' 6\")   Wt 68 kg (150 lb)   LMP 06/14/2023 (Approximate)   SpO2 100%   BMI 24.21 kg/m    Physical Exam  GENERAL: healthy, alert and no distress  EYES: Eyes grossly normal to inspection, PERRL and conjunctivae and sclerae normal  HENT: ear canals and TM's normal, nose and mouth without ulcers or lesions  NECK: no adenopathy, no asymmetry, masses, or scars and thyroid normal to palpation  RESP: lungs clear to auscultation - no rales, rhonchi or wheezes  CV: regular rate and rhythm, normal S1 S2, no S3 or S4, no murmur, click or rub, no peripheral edema and peripheral pulses strong  ABDOMEN: soft, nontender, no hepatosplenomegaly, no masses and bowel sounds normal  MS: no gross musculoskeletal defects noted, no edema  SKIN: no suspicious lesions or rashes  NEURO: Normal strength " and tone, mentation intact and speech normal  PSYCH: mentation appears normal, affect normal/bright    Diagnostic Test Results:  Labs reviewed in Epic    ASSESSMENT/PLAN:   Yolanda was seen today for physical.    Diagnoses and all orders for this visit:    Routine general medical examination at a health care facility    Migraine with aura and without status migrainosus, not intractable  Labs. Trial Zomig. Update us if not working well.   -     ZOLMitriptan (ZOMIG) 2.5 MG tablet; Take 1 tablet (2.5 mg) by mouth at onset of headache for migraine May repeat in 2 hours. Max 4 tablets/24 hours.  -     TSH with free T4 reflex; Future  -     CBC with platelets; Future  -     Vitamin D Deficiency; Future  -     Vitamin B12; Future  -     Comprehensive metabolic panel (BMP + Alb, Alk Phos, ALT, AST, Total. Bili, TP); Future  -     TSH with free T4 reflex  -     CBC with platelets  -     Vitamin D Deficiency  -     Vitamin B12  -     Comprehensive metabolic panel (BMP + Alb, Alk Phos, ALT, AST, Total. Bili, TP)    Screening cholesterol level  -     Lipid panel reflex to direct LDL Fasting; Future  -     Lipid panel reflex to direct LDL Fasting    Current mild episode of major depressive disorder without prior episode (H)  Stable on current regimen. Wants to continue therapy.     Other orders  -     REVIEW OF HEALTH MAINTENANCE PROTOCOL ORDERS        Patient has been advised of split billing requirements and indicates understanding: Yes      COUNSELING:  Reviewed preventive health counseling, as reflected in patient instructions        She reports that she has never smoked. She has never used smokeless tobacco.      Renetta Billings, [facility] [place]

## 2023-07-15 LAB — DEPRECATED CALCIDIOL+CALCIFEROL SERPL-MC: 51 UG/L (ref 20–75)

## 2023-07-24 ENCOUNTER — TELEPHONE (OUTPATIENT)
Dept: FAMILY MEDICINE | Facility: CLINIC | Age: 23
End: 2023-07-24
Payer: COMMERCIAL

## 2023-07-25 NOTE — TELEPHONE ENCOUNTER
Forms/Letter Request    Type of form/letter:  Eye Exam notes needed    Have you been seen for this request: N/A    Do we have the form/letter: Yes: In the TC folder up front    Who is the form from? [facility]       Where did/will the form come from? form was faxed in        
Completed forms, placed in [facility] folder to return fax.    Renetta Billings, CNP    
Form completed faxed to [phone] sent to scan. Carmen Elena CMA    
OV notes printed as there is JUJU from Pt to send to [facility]. PCP signature requested.    Placed in PCP bin  
Wheelchair/Stroller

## 2023-09-07 ENCOUNTER — MYC MEDICAL ADVICE (OUTPATIENT)
Dept: FAMILY MEDICINE | Facility: CLINIC | Age: 23
End: 2023-09-07
Payer: COMMERCIAL

## 2023-09-07 DIAGNOSIS — G43.109 MIGRAINE WITH AURA AND WITHOUT STATUS MIGRAINOSUS, NOT INTRACTABLE: Primary | ICD-10-CM

## 2023-09-08 NOTE — TELEPHONE ENCOUNTER
See update from pt regarding-    Migraine with aura and without status migrainosus, not intractable  Labs. Trial Zomig. Update us if not working well.   -     ZOLMitriptan (ZOMIG) 2.5 MG tablet

## 2023-09-11 RX ORDER — TOPIRAMATE 25 MG/1
25 TABLET, FILM COATED ORAL 2 TIMES DAILY
Qty: 180 TABLET | Refills: 0 | Status: SHIPPED | OUTPATIENT
Start: 2023-09-11 | End: 2023-11-15

## 2023-10-10 ASSESSMENT — HEADACHE IMPACT TEST (HIT 6)
HIT6 TOTAL SCORE: 71
HOW OFTEN HAVE YOU FELT FED UP OR IRRITATED BECAUSE OF YOUR HEADACHES: ALWAYS
HOW OFTEN HAVE YOU FELT TOO TIRED TO WORK BECAUSE OF YOUR HEADACHES: ALWAYS
WHEN YOU HAVE A HEADACHE HOW OFTEN DO YOU WISH YOU COULD LIE DOWN: VERY OFTEN
HOW OFTEN DO HEADACHES LIMIT YOUR DAILY ACTIVITIES: VERY OFTEN
HOW OFTEN DID HEADACHS LIMIT CONCENTRATION ON WORK OR DAILY ACTIVITY: ALWAYS
WHEN YOU HAVE HEADACHES HOW OFTEN IS THE PAIN SEVERE: SOMETIMES

## 2023-10-16 ENCOUNTER — TELEPHONE (OUTPATIENT)
Dept: NEUROLOGY | Facility: CLINIC | Age: 23
End: 2023-10-16
Payer: COMMERCIAL

## 2023-10-17 ENCOUNTER — OFFICE VISIT (OUTPATIENT)
Dept: NEUROLOGY | Facility: CLINIC | Age: 23
End: 2023-10-17
Attending: NURSE PRACTITIONER
Payer: COMMERCIAL

## 2023-10-17 VITALS — SYSTOLIC BLOOD PRESSURE: 132 MMHG | DIASTOLIC BLOOD PRESSURE: 90 MMHG | HEART RATE: 94 BPM | OXYGEN SATURATION: 99 %

## 2023-10-17 DIAGNOSIS — G44.52 NEW DAILY PERSISTENT HEADACHE: Primary | ICD-10-CM

## 2023-10-17 DIAGNOSIS — G43.109 MIGRAINE WITH AURA AND WITHOUT STATUS MIGRAINOSUS, NOT INTRACTABLE: ICD-10-CM

## 2023-10-17 PROCEDURE — 99205 OFFICE O/P NEW HI 60 MIN: CPT | Performed by: PSYCHIATRY & NEUROLOGY

## 2023-10-17 RX ORDER — RIZATRIPTAN BENZOATE 10 MG/1
10 TABLET, ORALLY DISINTEGRATING ORAL
Qty: 18 TABLET | Refills: 3 | Status: SHIPPED | OUTPATIENT
Start: 2023-10-17 | End: 2024-07-05

## 2023-10-17 NOTE — PROGRESS NOTES
[facility]   Headache Neurology Consult  October 17, 2023     Yolanda Tenorio MRN# [id number]   YOB: 2000 Age: 23 year old     Requesting provider: Renetta Billings CNP          Assessment and Recommendations:     Yolanda Tenorio is a 23 year old female who presents with longstanding history of episodic headaches with persistent daily headache beginning in the past 8 months to a year.  Notably she has been taking acute pain medications (Tylenol and ibuprofen) on a daily basis to try to treat headaches.  She has been having substantial difficulty with cognition and fatigue in the setting of chronic pain.  Does endorse difficulties with speech surrounding headaches as well but this is always present, worsening with intense headaches.    She is seeking a way to rule out medication overuse headache as a superimposed headache type we discussed that topiramate has been studied for this purpose and has been found to be effective.  I recommended that we start by decreasing use of acute analgesics to every other day while she is increasing topiramate on the titration schedule below.    We will obtain an MRI brain with and without contrast given that she has new daily persistent headache to rule out any other underlying potential contributors other than medication overuse headache.  Have also ordered bilateral supraorbital and occipital blocks to aid in the anticipated increase in pain while weaning off of acute pain medications.    Headache treatment plan:  Acute medication recommendations:  Bilateral supraorbital and occipital blocks  She may trial the Cefaly device and more information on this can be found at [email or web address]  She may trial FL-41 lenses if she finds that photophobia is significant for her  She may request antinausea medication if this becomes more significant  As an emergency treatment she may reach out and request prednisone burst for worsening pain during the acute  medication taper    Preventative medication recommendations:  Topiramate titration schedule:  Starting at 25 mg twice daily:  Increase the evening dose to 50 mg for the first week,  Week 2: 50 mg twice daily  Week 3: 50 mg morning/75 mg evening  Week 4: 75 mg twice daily  Week 5: 75 mg morning/100 mg evening  Week 6: 100 mg twice daily  She may stop anywhere along the schedule where she finds the most relief  Most common possible side effects include fatigue, cognitive impairments (both of which are already present and may find improvement once we have control of her pain), paresthesias, loss of appetite, kidney stones and glaucoma.  She endorses no history of kidney stones or glaucoma.    I will meet back with her in 2 months to evaluate efficacy of topiramate and her progress on stopping acute medications for headache.    Total time spent was 65 minutes in history, chart review, exam and counseling.      Alisha Nelson MD  Neurology            Chief Complaint:     Chief Complaint   Patient presents with    Headache     Ref: Renetta Billings CNP. Consistent - daily headache for years. Progressively worsening, migraines frequency increase. Headache/daily, migraines/1x week. Concerns: Memory/Concentration/Speech concerns. Did trial not using medication to see what would happen but it did not work. Medication: ibu, acetaminophen - limited relief, dull only. Topamax and sumatriptan no relief. Symptoms: varies with signs of it becoming a migraine, nausea, blurred vision, constant fog feeling daily, no auras, light sensitive           History is obtained from the patient and medical record.      Yolanda Tenorio is a 23 year old female whose first headaches at age 9 and was not missing school due to headaches. She has esotropia and amblyopia and therapies and patching and her eyes were ruled out as a cause of headaches.   She was told to use tylenol for headaches.   She continued to having headaches for years but in middle school  she told her mother that she was not learning anything due to the pain. She would not miss school unless she was vomiting. She did miss more in high school. More recently, headaches have worsened in frequency since the last 8 months to 1 year. She was getting headaches multiple times per week and migraine 2 times per month prior to that. She also had headache free days. There were no changes in her life, no infectious illness or trauma to the head.    She has a persistent headaches every day and she feels that she is always fatigued. She is sleeping 8-10 hours per night and not feeling rested.   She has pain retroorbitally and in the back of the neck and in through the temple and forehead area. It is worse behind the eye on the left with routine headaches. Migraines are holocephalic and like her head is popping off.   Pain is pressure like and sharp pushing and non-stop (with the severe headaches) but there is sometimes dull and throbbing.  No scalp tenderness. No diplopia or loss of vision. She does have difficulty with focus of vision. On her right side, there is some blurring of vision during a migraine. She has no visual aura. She does have difficulty with forming full sentences and stuttering and with short term memory and she will forget what she is speaking about. There is no recent trauma. She has been accommodating with to-do lists to recall items.    There is photophobia, and mild and rare phonophobia, osmophobia in the past but lost sense of smell with COVID. She can sometimes smell things. She will have nausea as a significant component of migraines. She also vomits. She will need to rest.     She has a severe headache 4 times per month. They range from 4-7/10. A 10/10 is the most severe headache. She has awoken with a severe 10/10 headache in the morning. This occurs sometimes and other times they take a while to progress. She can feel a severe one coming on.   Fogginess, speech issues and cognitive  issues are always present. Others have noticed the memory issues.    No fevers, no night sweats and little loss of appetite on topiramate, no weight loss.            Past Medical History:   None other than HPI          Past Surgical History:   No past surgical history on file.          Social History:     Social History     Socioeconomic History    Marital status: Single     Spouse name: Not on file    Number of children: Not on file    Years of education: Not on file    Highest education level: Not on file   Occupational History    Not on file   Tobacco Use    Smoking status: Never    Smokeless tobacco: Never   Vaping Use    Vaping Use: Never used   Substance and Sexual Activity    Alcohol use: Rare, every 2 weekends 1-2 drinks of beer    Drug use: No    Sexual activity: Yes     Partners: Male     Birth control/protection: Condom     Comment: 3/20/2015 Mac   Other Topics Concern    Not on file   Social History Narrative    Not on file     Social Determinants of Health     Financial Resource Strain: Low Risk  (1/10/2022)    Overall Financial Resource Strain (CARDIA)     Difficulty of Paying Living Expenses: Not very hard   Food Insecurity: No Food Insecurity (1/10/2022)    Hunger Vital Sign     Worried About Running Out of Food in the Last Year: Never true     Ran Out of Food in the Last Year: Never true   Transportation Needs: No Transportation Needs (1/10/2022)    PRAPARE - Transportation     Lack of Transportation (Medical): No     Lack of Transportation (Non-Medical): No   Physical Activity: Insufficiently Active (1/10/2022)    Exercise Vital Sign     Days of Exercise per Week: 3 days     Minutes of Exercise per Session: 30 min   Stress: Stress Concern Present (1/10/2022)    [employer] - Occupational Stress Questionnaire     Feeling of Stress : To some extent   Social Connections: Moderately Isolated (1/10/2022)    Social Connection and Isolation Panel [NHANES]     Frequency of  Communication with Friends and Family: Three times a week     Frequency of Social Gatherings with Friends and Family: Patient refused     Attends Spiritism Services: 1 to 4 times per year     Active Member of Clubs or Organizations: No     Attends Club or Organization Meetings: Not on file     Marital Status: Never    Interpersonal Safety: Not on file   Housing Stability: Low Risk  (1/10/2022)    Housing Stability Vital Sign     Unable to Pay for Housing in the Last Year: No     Number of Places Lived in the Last Year: 2     Unstable Housing in the Last Year: No             Family History:     Family History   Problem Relation Age of Onset    Family History Negative No family hx of              Allergies:    No Known Allergies          Medications:   Acute headache medications:  Tylenol 500mg-1000mg- daily, for years  Ibuprofen 600mg - daily for years    ZOLMitriptan (ZOMIG) 2.5 MG tablet, Take 1 tablet (2.5 mg) by mouth at onset of headache for migraine May repeat in 2 hours. Max 4 tablets/24 hours., No relief with this at all, has taken about 2 dose of it and 2.5 pills this past month    Preventative headache medications:    topiramate (TOPAMAX) 25 MG tablet, Take 1 tablet (25 mg) by mouth 2 times daily for 90 days, Disp: 180 tablet, tongue numbness, on this for 1 month and no change in symptoms            Physical Exam:   BP (!) 132/90   Pulse 94   SpO2 99%    Physical Exam:   Neurologic:   Mental Status Exam: Alert, awake and oriented to situation. No dysarthria. Speech of normal fluency.   Cranial Nerves: Fundoscopic exam with clear disc margins bilaterally. PERRLA, EOMs intact, no nystagmus, facial movements symmetric, facial sensation intact to light touch, hearing intact to conversation, trapezius and SCMs 5/5 bilaterally, tongue midline and fully mobile. No tongue atrophy.    Motor: Normal tone in all four extremities, no atrophy. 5/5 strength bilaterally in shoulder abduction, elbow extensors and  flexors, wrist extensors and flexors, hip flexors, knee extensors and flexors, dorsi- and plantarflexion. No tremors or abnormal movements noted.   Sensory: Sensation intact to pinprick and vibration sensation on arms and legs bilaterally.    Coordination: Finger-nose-finger intact bilaterally.  Rapidly alternating movements intact bilaterally in the upper extremities.  Normal finger tapping bilaterally.  Intact heel-shin bilaterally. Normal Romberg.   Reflexes: 2+ and symmetric in triceps, biceps, brachioradialis, patellar, Achilles, and plantars downgoing bilaterally.   Gait: Normal gait.  Able to toe and heel walk.  Tandem gait normal.  Head: Normocephalic, atraumatic. Pain with palpation over the supraorbital notches, pressure over occipital nerves.    Eyes: No conjunctival injection, no scleral icterus.           Data:     No imaging

## 2023-10-17 NOTE — LETTER
10/17/2023         RE: Yolanda Tenorio  [address] [place] [address]        Dear Colleague,    Thank you for referring your patient, Yolanda Tenorio, to the [facility]. Please see a copy of my visit note below.    [facility]   Headache Neurology Consult  October 17, 2023     Yolanda Tenorio MRN# [id number]   YOB: 2000 Age: 23 year old     Requesting provider: Renetta Billings CNP          Assessment and Recommendations:     Yolanda eTnorio is a 23 year old female who presents with longstanding history of episodic headaches with persistent daily headache beginning in the past 8 months to a year.  Notably she has been taking acute pain medications (Tylenol and ibuprofen) on a daily basis to try to treat headaches.  She has been having substantial difficulty with cognition and fatigue in the setting of chronic pain.  Does endorse difficulties with speech surrounding headaches as well but this is always present, worsening with intense headaches.    She is seeking a way to rule out medication overuse headache as a superimposed headache type we discussed that topiramate has been studied for this purpose and has been found to be effective.  I recommended that we start by decreasing use of acute analgesics to every other day while she is increasing topiramate on the titration schedule below.    We will obtain an MRI brain with and without contrast given that she has new daily persistent headache to rule out any other underlying potential contributors other than medication overuse headache.  Have also ordered bilateral supraorbital and occipital blocks to aid in the anticipated increase in pain while weaning off of acute pain medications.    Headache treatment plan:  Acute medication recommendations:  Bilateral supraorbital and occipital blocks  She may trial the Cefaly device and more information on this can be found at [email or web address].[email or web address]  She may trial FL-41  lenses if she finds that photophobia is significant for her  She may request antinausea medication if this becomes more significant  As an emergency treatment she may reach out and request prednisone burst for worsening pain during the acute medication taper    Preventative medication recommendations:  Topiramate titration schedule:  Starting at 25 mg twice daily:  Increase the evening dose to 50 mg for the first week,  Week 2: 50 mg twice daily  Week 3: 50 mg morning/75 mg evening  Week 4: 75 mg twice daily  Week 5: 75 mg morning/100 mg evening  Week 6: 100 mg twice daily  She may stop anywhere along the schedule where she finds the most relief  Most common possible side effects include fatigue, cognitive impairments (both of which are already present and may find improvement once we have control of her pain), paresthesias, loss of appetite, kidney stones and glaucoma.  She endorses no history of kidney stones or glaucoma.    I will meet back with her in 2 months to evaluate efficacy of topiramate and her progress on stopping acute medications for headache.    Total time spent was 65 minutes in history, chart review, exam and counseling.      Alisha Nelson MD  Neurology            Chief Complaint:     Chief Complaint   Patient presents with     Headache     Ref: Renetta Billings CNP. Consistent - daily headache for years. Progressively worsening, migraines frequency increase. Headache/daily, migraines/1x week. Concerns: Memory/Concentration/Speech concerns. Did trial not using medication to see what would happen but it did not work. Medication: ibu, acetaminophen - limited relief, dull only. Topamax and sumatriptan no relief. Symptoms: varies with signs of it becoming a migraine, nausea, blurred vision, constant fog feeling daily, no auras, light sensitive           History is obtained from the patient and medical record.      Yolanda Tenorio is a 23 year old female whose first headaches at age 9 and was not missing school  due to headaches. She has esotropia and amblyopia and therapies and patching and her eyes were ruled out as a cause of headaches.   She was told to use tylenol for headaches.   She continued to having headaches for years but in middle school she told her mother that she was not learning anything due to the pain. She would not miss school unless she was vomiting. She did miss more in high school. More recently, headaches have worsened in frequency since the last 8 months to 1 year. She was getting headaches multiple times per week and migraine 2 times per month prior to that. She also had headache free days. There were no changes in her life, no infectious illness or trauma to the head.    She has a persistent headaches every day and she feels that she is always fatigued. She is sleeping 8-10 hours per night and not feeling rested.   She has pain retroorbitally and in the back of the neck and in through the temple and forehead area. It is worse behind the eye on the left with routine headaches. Migraines are holocephalic and like her head is popping off.   Pain is pressure like and sharp pushing and non-stop (with the severe headaches) but there is sometimes dull and throbbing.  No scalp tenderness. No diplopia or loss of vision. She does have difficulty with focus of vision. On her right side, there is some blurring of vision during a migraine. She has no visual aura. She does have difficulty with forming full sentences and stuttering and with short term memory and she will forget what she is speaking about. There is no recent trauma. She has been accommodating with to-do lists to recall items.    There is photophobia, and mild and rare phonophobia, osmophobia in the past but lost sense of smell with COVID. She can sometimes smell things. She will have nausea as a significant component of migraines. She also vomits. She will need to rest.     She has a severe headache 4 times per month. They range from 4-7/10. A  10/10 is the most severe headache. She has awoken with a severe 10/10 headache in the morning. This occurs sometimes and other times they take a while to progress. She can feel a severe one coming on.   Fogginess, speech issues and cognitive issues are always present. Others have noticed the memory issues.    No fevers, no night sweats and little loss of appetite on topiramate, no weight loss.            Past Medical History:   None other than HPI          Past Surgical History:   No past surgical history on file.          Social History:     Social History     Socioeconomic History     Marital status: Single     Spouse name: Not on file     Number of children: Not on file     Years of education: Not on file     Highest education level: Not on file   Occupational History     Not on file   Tobacco Use     Smoking status: Never     Smokeless tobacco: Never   Vaping Use     Vaping Use: Never used   Substance and Sexual Activity     Alcohol use: Rare, every 2 weekends 1-2 drinks of beer    Drug use: No     Sexual activity: Yes     Partners: Male     Birth control/protection: Condom     Comment: 3/20/2015 Mac   Other Topics Concern     Not on file   Social History Narrative     Not on file     Social Determinants of Health     Financial Resource Strain: Low Risk  (1/10/2022)    Overall Financial Resource Strain (CARDIA)      Difficulty of Paying Living Expenses: Not very hard   Food Insecurity: No Food Insecurity (1/10/2022)    Hunger Vital Sign      Worried About Running Out of Food in the Last Year: Never true      Ran Out of Food in the Last Year: Never true   Transportation Needs: No Transportation Needs (1/10/2022)    PRAPARE - Transportation      Lack of Transportation (Medical): No      Lack of Transportation (Non-Medical): No   Physical Activity: Insufficiently Active (1/10/2022)    Exercise Vital Sign      Days of Exercise per Week: 3 days      Minutes of Exercise per Session: 30 min   Stress: Stress Concern  Present (1/10/2022)    [facility] of Occupational Health - Occupational Stress Questionnaire      Feeling of Stress : To some extent   Social Connections: Moderately Isolated (1/10/2022)    Social Connection and Isolation Panel [NHANES]      Frequency of Communication with Friends and Family: Three times a week      Frequency of Social Gatherings with Friends and Family: Patient refused      Attends [employer] Services: 1 to 4 times per year      Active Member of Clubs or Organizations: No      Attends Club or Organization Meetings: Not on file      Marital Status: Never    Interpersonal Safety: Not on file   Housing Stability: Low Risk  (1/10/2022)    Housing Stability Vital Sign      Unable to Pay for Housing in the Last Year: No      Number of Places Lived in the Last Year: 2      Unstable Housing in the Last Year: No             Family History:     Family History   Problem Relation Age of Onset     Family History Negative No family hx of              Allergies:    No Known Allergies          Medications:   Acute headache medications:  Tylenol 500mg-1000mg- daily, for years  Ibuprofen 600mg - daily for years     ZOLMitriptan (ZOMIG) 2.5 MG tablet, Take 1 tablet (2.5 mg) by mouth at onset of headache for migraine May repeat in 2 hours. Max 4 tablets/24 hours., No relief with this at all, has taken about 2 dose of it and 2.5 pills this past month    Preventative headache medications:     topiramate (TOPAMAX) 25 MG tablet, Take 1 tablet (25 mg) by mouth 2 times daily for 90 days, Disp: 180 tablet, tongue numbness, on this for 1 month and no change in symptoms            Physical Exam:   BP (!) 132/90   Pulse 94   SpO2 99%    Physical Exam:   Neurologic:   Mental Status Exam: Alert, awake and oriented to situation. No dysarthria. Speech of normal fluency.   Cranial Nerves: Fundoscopic exam with clear disc margins bilaterally. PERRLA, EOMs intact, no nystagmus, facial movements symmetric, facial  sensation intact to light touch, hearing intact to conversation, trapezius and SCMs 5/5 bilaterally, tongue midline and fully mobile. No tongue atrophy.    Motor: Normal tone in all four extremities, no atrophy. 5/5 strength bilaterally in shoulder abduction, elbow extensors and flexors, wrist extensors and flexors, hip flexors, knee extensors and flexors, dorsi- and plantarflexion. No tremors or abnormal movements noted.   Sensory: Sensation intact to pinprick and vibration sensation on arms and legs bilaterally.    Coordination: Finger-nose-finger intact bilaterally.  Rapidly alternating movements intact bilaterally in the upper extremities.  Normal finger tapping bilaterally.  Intact heel-shin bilaterally. Normal Romberg.   Reflexes: 2+ and symmetric in triceps, biceps, brachioradialis, patellar, Achilles, and plantars downgoing bilaterally.   Gait: Normal gait.  Able to toe and heel walk.  Tandem gait normal.  Head: Normocephalic, atraumatic. Pain with palpation over the supraorbital notches, pressure over occipital nerves.    Eyes: No conjunctival injection, no scleral icterus.           Data:     No imaging       Again, thank you for allowing me to participate in the care of your patient.        Sincerely,        Alisha Nelson MD

## 2023-10-17 NOTE — NURSING NOTE
"Yolanda Tenorio is a 23 year old female who presents for:  Chief Complaint   Patient presents with    Headache     Ref: Renetta Billings REGINO. Consistent - daily headache for years. Progressively worsening, migraines frequency increase. Headache/daily, migraines/1x week. Concerns: Memory/Concentration/Speech concerns. Did trial not using medication to see what would happen but it did not work. Medication: ibu, acetaminophen - limited relief, dull only. Topamax and sumatriptan no relief. Symptoms: varies with signs of it becoming a migraine, nausea, blurred vision, constant fog feeling daily, no auras, light sensitive        Initial Vitals:  BP (!) 132/90   Pulse 94   SpO2 99%  Estimated body mass index is 24.21 kg/m  as calculated from the following:    Height as of 7/14/23: 1.676 m (5' 6\").    Weight as of 7/14/23: 68 kg (150 lb).. There is no height or weight on file to calculate BSA. BP completed using cuff size: rangel Mijares CMA    "

## 2023-10-25 DIAGNOSIS — G44.52 NEW DAILY PERSISTENT HEADACHE: Primary | ICD-10-CM

## 2023-10-25 RX ORDER — LORAZEPAM 0.5 MG/1
0.5 TABLET ORAL
Qty: 1 TABLET | Refills: 0 | Status: SHIPPED | OUTPATIENT
Start: 2023-10-25 | End: 2023-11-03

## 2023-10-31 ENCOUNTER — ANCILLARY PROCEDURE (OUTPATIENT)
Dept: MRI IMAGING | Facility: CLINIC | Age: 23
End: 2023-10-31
Attending: PSYCHIATRY & NEUROLOGY
Payer: COMMERCIAL

## 2023-10-31 DIAGNOSIS — G43.109 MIGRAINE WITH AURA AND WITHOUT STATUS MIGRAINOSUS, NOT INTRACTABLE: ICD-10-CM

## 2023-10-31 DIAGNOSIS — G44.52 NEW DAILY PERSISTENT HEADACHE: ICD-10-CM

## 2023-10-31 PROCEDURE — 70551 MRI BRAIN STEM W/O DYE: CPT

## 2023-11-03 ENCOUNTER — OFFICE VISIT (OUTPATIENT)
Dept: PALLIATIVE MEDICINE | Facility: CLINIC | Age: 23
End: 2023-11-03
Attending: PSYCHIATRY & NEUROLOGY
Payer: COMMERCIAL

## 2023-11-03 VITALS — SYSTOLIC BLOOD PRESSURE: 122 MMHG | HEART RATE: 95 BPM | OXYGEN SATURATION: 98 % | DIASTOLIC BLOOD PRESSURE: 79 MMHG

## 2023-11-03 DIAGNOSIS — G43.109 MIGRAINE WITH AURA AND WITHOUT STATUS MIGRAINOSUS, NOT INTRACTABLE: ICD-10-CM

## 2023-11-03 PROCEDURE — 64405 NJX AA&/STRD GR OCPL NRV: CPT | Mod: 50 | Performed by: PHYSICAL MEDICINE & REHABILITATION

## 2023-11-03 RX ORDER — BUPIVACAINE HYDROCHLORIDE 5 MG/ML
5 INJECTION, SOLUTION EPIDURAL; INTRACAUDAL ONCE
Status: COMPLETED | OUTPATIENT
Start: 2023-11-03 | End: 2023-11-03

## 2023-11-03 RX ORDER — TRIAMCINOLONE ACETONIDE 40 MG/ML
40 INJECTION, SUSPENSION INTRA-ARTICULAR; INTRAMUSCULAR ONCE
Status: COMPLETED | OUTPATIENT
Start: 2023-11-03 | End: 2023-11-03

## 2023-11-03 RX ADMIN — BUPIVACAINE HYDROCHLORIDE 25 MG: 5 INJECTION, SOLUTION EPIDURAL; INTRACAUDAL at 16:34

## 2023-11-03 RX ADMIN — TRIAMCINOLONE ACETONIDE 40 MG: 40 INJECTION, SUSPENSION INTRA-ARTICULAR; INTRAMUSCULAR at 16:34

## 2023-11-03 ASSESSMENT — PAIN SCALES - GENERAL: PAINLEVEL: MILD PAIN (3)

## 2023-11-03 NOTE — PATIENT INSTRUCTIONS
[facility]  Post Procedure Instructions    Today you had:   occipital nerve block      Medications used:  bupivacaine   kenalog       Monitor the injection sites for signs and symptoms of infection-fever, chills, redness, swelling, warmth, or drainage to areas.  You may have soreness at injection sites for up to 24 hours.  It may take up to 14 days for the steroid medication to start working although you may feel the effect as early as a few days after the procedure.     You may apply ice to the painful areas to help minimize the discomfort of the needle pokes.  Do not apply heat to sites for at least 12 hours.  You may use anti-inflammatory medications or Tylenol for pain control if necessary    Pain Clinic phone number during work hours (Monday through Friday 8 am-4:30 pm) at [phone] or the Provider Line after hours at [phone]:

## 2023-11-03 NOTE — PROGRESS NOTES
Pre procedure Diagnosis: occipital neuralgia   Post procedure Diagnosis: Same  Procedure performed: Bilateral occipital nerve blocks  Anesthesia: none  Complications: none  Operators: Diana Crystal MD    Indications:   Yolanda Tenorio is a 23 year old female with a history of chronic daily headaches.      Options/alternatives, benefits and risks were discussed with the patient including bleeding, infection, hematoma, nerve damage, and stroke, Questions were answered to her satisfaction and she agrees to proceed. Voluntary informed consent was obtained and signed.     Vitals were reviewed: Yes  Allergies were reviewed:  Yes   Medications were reviewed:  Yes     Procedure:  After getting informed consent, a Pause for the Cause was performed.    The occipital ridge, occipital protuberance, and mastoid process were palpated on the left side.  The location of maximal tenderness which was consistent with the location of the greater occipital nerve was palpated.  The area was cleaned.    Palpation for a pulse was completed, with no pulse noted at the site of the injection.  A 25G, 1.5 inch needle was introduced, aimed cephalad, in the superficial tissues at this area of tenderness.  The injection was completed at this location, fanning in 3 different directions.  Aspiration for heme was negative before all injections. The same procedure was repeated on the right side.     In total, 5 ml of 0.5% bupivacaine and 40 mg kenalog was injected equally between both sides.     The patient tolerated the procedure well, hemostasis was achieved.      Follow-up includes:   -f/u with referring provider      Diana Crystal MD  [facility]

## 2023-11-08 NOTE — RESULT ENCOUNTER NOTE
MRI brain shows no visual abnormalities to my eye to explain headaches. If she has any symptoms of sinusitis, can have these evaluated by PCP, but headaches have been present 8 months, and this would be too extended a duration to be the explanation for symptoms.

## 2023-11-15 DIAGNOSIS — G43.109 MIGRAINE WITH AURA AND WITHOUT STATUS MIGRAINOSUS, NOT INTRACTABLE: ICD-10-CM

## 2023-11-15 RX ORDER — TOPIRAMATE 25 MG/1
100 TABLET, FILM COATED ORAL DAILY
Qty: 180 TABLET | Refills: 3 | Status: SHIPPED | OUTPATIENT
Start: 2023-11-15 | End: 2024-03-19

## 2023-11-15 RX ORDER — TOPIRAMATE 25 MG/1
100 TABLET, FILM COATED ORAL DAILY
Qty: 180 TABLET | Refills: 0 | Status: SHIPPED | OUTPATIENT
Start: 2023-11-15 | End: 2023-11-15

## 2023-12-18 ASSESSMENT — HEADACHE IMPACT TEST (HIT 6)
HOW OFTEN DID HEADACHS LIMIT CONCENTRATION ON WORK OR DAILY ACTIVITY: VERY OFTEN
WHEN YOU HAVE A HEADACHE HOW OFTEN DO YOU WISH YOU COULD LIE DOWN: ALWAYS
HIT6 TOTAL SCORE: 70
WHEN YOU HAVE HEADACHES HOW OFTEN IS THE PAIN SEVERE: VERY OFTEN
HOW OFTEN HAVE YOU FELT FED UP OR IRRITATED BECAUSE OF YOUR HEADACHES: VERY OFTEN
HOW OFTEN DO HEADACHES LIMIT YOUR DAILY ACTIVITIES: VERY OFTEN
HOW OFTEN HAVE YOU FELT TOO TIRED TO WORK BECAUSE OF YOUR HEADACHES: ALWAYS

## 2023-12-19 ENCOUNTER — OFFICE VISIT (OUTPATIENT)
Dept: NEUROLOGY | Facility: CLINIC | Age: 23
End: 2023-12-19
Payer: COMMERCIAL

## 2023-12-19 VITALS
DIASTOLIC BLOOD PRESSURE: 85 MMHG | HEIGHT: 66 IN | WEIGHT: 140 LBS | OXYGEN SATURATION: 100 % | BODY MASS INDEX: 22.5 KG/M2 | HEART RATE: 90 BPM | SYSTOLIC BLOOD PRESSURE: 121 MMHG

## 2023-12-19 DIAGNOSIS — G43.109 MIGRAINE WITH AURA AND WITHOUT STATUS MIGRAINOSUS, NOT INTRACTABLE: Primary | ICD-10-CM

## 2023-12-19 DIAGNOSIS — G44.52 NEW DAILY PERSISTENT HEADACHE: ICD-10-CM

## 2023-12-19 PROCEDURE — 99213 OFFICE O/P EST LOW 20 MIN: CPT | Performed by: PSYCHIATRY & NEUROLOGY

## 2023-12-19 RX ORDER — ATENOLOL 25 MG/1
TABLET ORAL
Qty: 291 TABLET | Refills: 0 | Status: SHIPPED | OUTPATIENT
Start: 2023-12-19 | End: 2024-07-05

## 2023-12-19 ASSESSMENT — PAIN SCALES - GENERAL: PAINLEVEL: MODERATE PAIN (4)

## 2023-12-19 ASSESSMENT — PATIENT HEALTH QUESTIONNAIRE - PHQ9: SUM OF ALL RESPONSES TO PHQ QUESTIONS 1-9: 11

## 2023-12-19 NOTE — PROGRESS NOTES
I am seeing Ms. Tenorio back to review efficacy of topiramate and to potentially replace with a different medication.     She has stopped going to the gym as her feet have been going numb on topiramate. She was unable to tolerate due to this side effect.   Headaches have had no benefit on this medication and increases were not tolerated due to extreme constipation and stomach ache. She was not constipated before this medication.     She likes to lift and perform cardio during her workouts.   She does endorse awakening feeling unrefreshed. She is tired throughout the day. She and her therapist do not think that mood is part of the issue with this. We discussed obtaining an overnight oximetry study to see if there is any evidence of sleep apnea.  I have ordered this today.     She should stop topiramate on the following taper schedule:    Week 1: reduce to 75mg bedtime  Week 2: reduce to 50mg bedtime  Week 3: reduce to 25mg bedtime  Week 4: Stop topiramate    She can then start atenolol on the following schedule:    Week 1: Atenolol 25mg bedtime  Week 2: Atenolol 50mg bedtime  Week 3: Atenolol 75mg bedtime    Stay on atenolol 75mg if tolerated for 3 months and we will assess efficacy at that time.     We will defer an acute medication until headaches are better controlled.    Total time spent today was 29 minutes.

## 2023-12-19 NOTE — LETTER
12/19/2023         RE: Yolanda Tenorio  [address] [place] [address]        Dear Colleague,    Thank you for referring your patient, Yolanda Tenorio, to the [facility]. Please see a copy of my visit note below.    I am seeing Ms. Tenorio back to review efficacy of topiramate and to potentially replace with a different medication.     She has stopped going to the gym as her feet have been going numb on topiramate. She was unable to tolerate due to this side effect.   Headaches have had no benefit on this medication and increases were not tolerated due to extreme constipation and stomach ache. She was not constipated before this medication.     She likes to lift and perform cardio during her workouts.   She does endorse awakening feeling unrefreshed. She is tired throughout the day. She and her therapist do not think that mood is part of the issue with this. We discussed obtaining an overnight oximetry study to see if there is any evidence of sleep apnea.  I have ordered this today.     She should stop topiramate on the following taper schedule:    Week 1: reduce to 75mg bedtime  Week 2: reduce to 50mg bedtime  Week 3: reduce to 25mg bedtime  Week 4: Stop topiramate    She can then start atenolol on the following schedule:    Week 1: Atenolol 25mg bedtime  Week 2: Atenolol 50mg bedtime  Week 3: Atenolol 75mg bedtime    Stay on atenolol 75mg if tolerated for 3 months and we will assess efficacy at that time.     We will defer an acute medication until headaches are better controlled.    Total time spent today was 29 minutes.      Again, thank you for allowing me to participate in the care of your patient.        Sincerely,        Alisha Nelson MD

## 2023-12-19 NOTE — NURSING NOTE
"Yolanda Tenorio is a 23 year old female who presents for:  Chief Complaint   Patient presents with    Headache     2 mo follow up  - no improvement post lowering dose, no neg side effects either.         Initial Vitals:  /85   Pulse 90   Ht 1.676 m (5' 6\")   Wt 63.5 kg (140 lb)   SpO2 100%   BMI 22.60 kg/m   Estimated body mass index is 22.6 kg/m  as calculated from the following:    Height as of this encounter: 1.676 m (5' 6\").    Weight as of this encounter: 63.5 kg (140 lb).. Body surface area is 1.72 meters squared. BP completed using cuff size: rangel Mijares CMA    "

## 2024-01-19 ENCOUNTER — MYC MEDICAL ADVICE (OUTPATIENT)
Dept: NEUROLOGY | Facility: CLINIC | Age: 24
End: 2024-01-19
Payer: COMMERCIAL

## 2024-01-22 NOTE — TELEPHONE ENCOUNTER
Patient is reaching out to us in regards to not being scheduled for Oxygen.    Per MYC:  I was never contacted about the sleep oxygen test you had recommended and wasn t sure the next step.      Thank you ,   Yolanda     LOV: 12/19/23    We discussed obtaining an overnight oximetry study to see if there is any evidence of sleep apnea.  I have ordered this today.     NOV: 03/19/24    Order: Overnight Oximetry Testing.    MYC sent to patient with information regarding testing numbers and 2 options.     Nina Arriaza MA  [facility]

## 2024-01-29 ENCOUNTER — MYC MEDICAL ADVICE (OUTPATIENT)
Dept: NEUROLOGY | Facility: CLINIC | Age: 24
End: 2024-01-29
Payer: COMMERCIAL

## 2024-01-30 ENCOUNTER — TRANSFERRED RECORDS (OUTPATIENT)
Dept: HEALTH INFORMATION MANAGEMENT | Facility: CLINIC | Age: 24
End: 2024-01-30
Payer: COMMERCIAL

## 2024-01-30 NOTE — TELEPHONE ENCOUNTER
Faxed  Overnight oximetry order/ demographic face sheet  January 30, 2024 to fax number [phone] ([facility])    Right Fax confirmed at 836 AM    Zohreh Manning RN

## 2024-03-11 DIAGNOSIS — G43.109 MIGRAINE WITH AURA AND WITHOUT STATUS MIGRAINOSUS, NOT INTRACTABLE: ICD-10-CM

## 2024-03-11 DIAGNOSIS — G44.52 NEW DAILY PERSISTENT HEADACHE: ICD-10-CM

## 2024-03-19 ENCOUNTER — OFFICE VISIT (OUTPATIENT)
Dept: NEUROLOGY | Facility: CLINIC | Age: 24
End: 2024-03-19
Payer: COMMERCIAL

## 2024-03-19 VITALS
HEART RATE: 68 BPM | HEIGHT: 66 IN | SYSTOLIC BLOOD PRESSURE: 120 MMHG | DIASTOLIC BLOOD PRESSURE: 72 MMHG | WEIGHT: 140 LBS | OXYGEN SATURATION: 98 % | BODY MASS INDEX: 22.5 KG/M2

## 2024-03-19 DIAGNOSIS — G43.109 MIGRAINE WITH AURA AND WITHOUT STATUS MIGRAINOSUS, NOT INTRACTABLE: Primary | ICD-10-CM

## 2024-03-19 DIAGNOSIS — G44.52 NEW DAILY PERSISTENT HEADACHE: ICD-10-CM

## 2024-03-19 PROCEDURE — 99214 OFFICE O/P EST MOD 30 MIN: CPT | Performed by: PSYCHIATRY & NEUROLOGY

## 2024-03-19 ASSESSMENT — HEADACHE IMPACT TEST (HIT 6)
HOW OFTEN DO HEADACHES LIMIT YOUR DAILY ACTIVITIES: VERY OFTEN
HOW OFTEN HAVE YOU FELT FED UP OR IRRITATED BECAUSE OF YOUR HEADACHES: VERY OFTEN
HOW OFTEN HAVE YOU FELT TOO TIRED TO WORK BECAUSE OF YOUR HEADACHES: VERY OFTEN
HIT6 TOTAL SCORE: 68
WHEN YOU HAVE A HEADACHE HOW OFTEN DO YOU WISH YOU COULD LIE DOWN: ALWAYS
HOW OFTEN DID HEADACHS LIMIT CONCENTRATION ON WORK OR DAILY ACTIVITY: VERY OFTEN
WHEN YOU HAVE HEADACHES HOW OFTEN IS THE PAIN SEVERE: VERY OFTEN

## 2024-03-19 ASSESSMENT — MIGRAINE DISABILITY ASSESSMENT (MIDAS)
TOTAL SCORE: 155
HOW MANY DAYS WAS YOUR PRODUCTIVITY CUT IN HALF BECAUSE OF HEADACHES: 60
HOW MANY DAYS DID YOU MISS WORK OR SCHOOL BECAUSE OF HEADACHES: 0
HOW MANY DAYS IN THE PAST 3 MONTHS HAVE YOU HAD A HEADACHE: 70
ON A SCALE FROM 0-10 ON AVERAGE HOW PAINFUL WERE HEADACHES: 4
HOW MANY DAYS WAS HOUSEWORK PRODUCTIVITY CUT IN HALF DUE TO HEADACHES: 50
HOW OFTEN WERE SOCIAL ACTIVITIES MISSED DUE TO HEADACHES: 5
HOW MANY DAYS DID YOU NOT DO HOUSEWORK BECAUSE OF HEADACHES: 40

## 2024-03-19 NOTE — PROGRESS NOTES
I met with Ms. Tenorio to evaluate her treatment plan.     Started on the following plan last visit:    Recall:  Yolanda Tenorio is a 23 year old female who presents with longstanding history of episodic headaches with persistent daily headache beginning in the past 8 months to a year.  Notably she has been taking acute pain medications (Tylenol and ibuprofen) on a daily basis to try to treat headaches.  She has been having substantial difficulty with cognition and fatigue in the setting of chronic pain.  Does endorse difficulties with speech surrounding headaches as well but this is always present, worsening with intense headaches.     She has been successful with decreasing acute medication treatments over the course of her treatment since her first visit. Persistent daily headache has continued despite this.  We obtained an MRI brain with and without contrast with no findings supportive of chronic daily headache causes. Previously also ordered bilateral supraorbital and occipital blocks but these were not successful in relieving her headaches.    Atenolol has had no impact on headaches. This has not helped at all. She has taken rizatriptan about 4-5 times since our last visit and OTCs once.  She wanted to avoid tricyclic antidepressants due  to adverse reactions to the combination antidepressant medications in the past.    30/30 per month and of these in the past month has had about 7-8 most severe and 2 were treated with Rizatriptan. Average pain is a 3-4/7 and the worst is a 7/10.     Overnight oximetry was positive for multiple desatuations to 86% and referred to sleep medicine today.     Headache treatment plan:  Acute medication recommendations:  She may trial the Cefaly device and more information on this can be found at www.[employer].com  She may trial FL-41 lenses if she finds that photophobia is significant for her  She may request antinausea medication if this becomes more significant  As an emergency treatment she  may reach out and request prednisone burst for worsening pain during the acute medication taper     Preventative medication recommendations:  We reviewed Emgality vs. Botox and ordered Emgality which will go through prior authorization.  Wean off atenolol which after 3 months on 75mg was not successful (no difference at all in headaches).      Week 1: Atenolol 50mg bedtime  Week 2: Atenolol 25mg bedtime  Week 3: Off atenolol    She has previously tried topiramate which she did not tolerate due to brain fog, fatigue and stomach upset.     Total time today was 36 minutes.

## 2024-03-19 NOTE — LETTER
3/19/2024         RE: Yolanda Tenorio  [address] [place] [address]        Dear Colleague,    Thank you for referring your patient, Yolanda Tenorio, to the [facility]. Please see a copy of my visit note below.    I met with Ms. Tenorio to evaluate her treatment plan.     Started on the following plan last visit:    Recall:  Yolanda Tenorio is a 23 year old female who presents with longstanding history of episodic headaches with persistent daily headache beginning in the past 8 months to a year.  Notably she has been taking acute pain medications (Tylenol and ibuprofen) on a daily basis to try to treat headaches.  She has been having substantial difficulty with cognition and fatigue in the setting of chronic pain.  Does endorse difficulties with speech surrounding headaches as well but this is always present, worsening with intense headaches.     She has been successful with decreasing acute medication treatments over the course of her treatment since her first visit. Persistent daily headache has continued despite this.  We obtained an MRI brain with and without contrast with no findings supportive of chronic daily headache causes. Previously also ordered bilateral supraorbital and occipital blocks but these were not successful in relieving her headaches.    Atenolol has had no impact on headaches. This has not helped at all. She has taken rizatriptan about 4-5 times since our last visit and OTCs once.  She wanted to avoid tricyclic antidepressants due  to adverse reactions to the combination antidepressant medications in the past.    30/30 per month and of these in the past month has had about 7-8 most severe and 2 were treated with Rizatriptan. Average pain is a 3-4/7 and the worst is a 7/10.     Overnight oximetry was positive for multiple desatuations to 86% and referred to sleep medicine today.     Headache treatment plan:  Acute medication recommendations:  She may trial the Cefaly  device and more information on this can be found at [email or web address]  She may trial FL-41 lenses if she finds that photophobia is significant for her  She may request antinausea medication if this becomes more significant  As an emergency treatment she may reach out and request prednisone burst for worsening pain during the acute medication taper     Preventative medication recommendations:  We reviewed Emgality vs. Botox and ordered Emgality which will go through prior authorization.  Wean off atenolol which after 3 months on 75mg was not successful (no difference at all in headaches).      Week 1: Atenolol 50mg bedtime  Week 2: Atenolol 25mg bedtime  Week 3: Off atenolol    She has previously tried topiramate which she did not tolerate due to brain fog, fatigue and stomach upset.     Total time today was 36 minutes.      Again, thank you for allowing me to participate in the care of your patient.        Sincerely,        Alisha Nelson MD

## 2024-05-20 ENCOUNTER — TELEPHONE (OUTPATIENT)
Dept: FAMILY MEDICINE | Facility: CLINIC | Age: 24
End: 2024-05-20
Payer: COMMERCIAL

## 2024-05-20 NOTE — TELEPHONE ENCOUNTER
Needs of attention regarding:  -Depression    Health Maintenance Topics with due status: Overdue       Topic Date Due    DEPRESSION ACTION PLAN Never done    CHLAMYDIA SCREENING 01/10/2023    COVID-19 Vaccine Never done    DEPRESSION 12 MO INDEX REPEAT PHQ-9 05/15/2024     Health Maintenance Topics with due status: Due Soon       Topic Date Due    PHQ-9 06/19/2024       Communication:  See MyChart message

## 2024-06-09 RX ORDER — ATENOLOL 25 MG/1
TABLET ORAL
Qty: 291 TABLET | Refills: 0 | OUTPATIENT
Start: 2024-06-09

## 2024-06-14 ENCOUNTER — PATIENT OUTREACH (OUTPATIENT)
Dept: CARE COORDINATION | Facility: CLINIC | Age: 24
End: 2024-06-14
Payer: COMMERCIAL

## 2024-06-28 ENCOUNTER — PATIENT OUTREACH (OUTPATIENT)
Dept: CARE COORDINATION | Facility: CLINIC | Age: 24
End: 2024-06-28
Payer: COMMERCIAL

## 2024-07-04 ASSESSMENT — HEADACHE IMPACT TEST (HIT 6)
HOW OFTEN DO HEADACHES LIMIT YOUR DAILY ACTIVITIES: SOMETIMES
HIT6 TOTAL SCORE: 55
HOW OFTEN HAVE YOU FELT FED UP OR IRRITATED BECAUSE OF YOUR HEADACHES: RARELY
HOW OFTEN HAVE YOU FELT TOO TIRED TO WORK BECAUSE OF YOUR HEADACHES: RARELY
WHEN YOU HAVE HEADACHES HOW OFTEN IS THE PAIN SEVERE: SOMETIMES
WHEN YOU HAVE A HEADACHE HOW OFTEN DO YOU WISH YOU COULD LIE DOWN: VERY OFTEN
HOW OFTEN DID HEADACHS LIMIT CONCENTRATION ON WORK OR DAILY ACTIVITY: RARELY

## 2024-07-04 ASSESSMENT — MIGRAINE DISABILITY ASSESSMENT (MIDAS)
HOW OFTEN WERE SOCIAL ACTIVITIES MISSED DUE TO HEADACHES: 10
ON A SCALE FROM 0-10 ON AVERAGE HOW PAINFUL WERE HEADACHES: 4
HOW MANY DAYS IN THE PAST 3 MONTHS HAVE YOU HAD A HEADACHE: 15
HOW MANY DAYS DID YOU NOT DO HOUSEWORK BECAUSE OF HEADACHES: 10
HOW MANY DAYS WAS YOUR PRODUCTIVITY CUT IN HALF BECAUSE OF HEADACHES: 10
HOW MANY DAYS WAS HOUSEWORK PRODUCTIVITY CUT IN HALF DUE TO HEADACHES: 10
HOW MANY DAYS DID YOU MISS WORK OR SCHOOL BECAUSE OF HEADACHES: 0
TOTAL SCORE: 40

## 2024-07-05 ENCOUNTER — VIRTUAL VISIT (OUTPATIENT)
Dept: NEUROLOGY | Facility: CLINIC | Age: 24
End: 2024-07-05
Payer: COMMERCIAL

## 2024-07-05 DIAGNOSIS — G43.109 MIGRAINE WITH AURA AND WITHOUT STATUS MIGRAINOSUS, NOT INTRACTABLE: Primary | ICD-10-CM

## 2024-07-05 PROCEDURE — G2211 COMPLEX E/M VISIT ADD ON: HCPCS | Mod: 95 | Performed by: PSYCHIATRY & NEUROLOGY

## 2024-07-05 PROCEDURE — 99214 OFFICE O/P EST MOD 30 MIN: CPT | Mod: 95 | Performed by: PSYCHIATRY & NEUROLOGY

## 2024-07-05 RX ORDER — RIZATRIPTAN BENZOATE 10 MG/1
10 TABLET, ORALLY DISINTEGRATING ORAL
Qty: 18 TABLET | Refills: 3 | Status: SHIPPED | OUTPATIENT
Start: 2024-07-05 | End: 2024-08-06

## 2024-07-05 ASSESSMENT — PAIN SCALES - GENERAL: PAINLEVEL: NO PAIN (0)

## 2024-07-05 NOTE — LETTER
7/5/2024      Yolanda Tenorio  [address] [place] [address]      Dear Colleague,    Thank you for referring your patient, Yolanda Tenorio, to the [facility]. Please see a copy of my visit note below.    Virtual Visit Details    Type of service:  Video Visit     Originating Location (pt. Location): Home    Distant Location (provider location):  On-site  Platform used for Video Visit: Terry    I met with Ms. Yolanda Tenorio today in return visit.     Previously, was having 30/30 days per month (persistent) headaches, 8 of which were severe as a result of chronic migraine. Associated symptoms at that time were difficulty with cognition, nausea and speech issues.    On Emgality, she has had about 2 severe headache days and about 2 rebound days. She has now completed her 3rd round of this medication. Rizatriptan has not worked well for her.   She had trialed Zolmatriptan, Sumatriptan and now Rizatriptan without benefit. Will prescribe Ubrelvy 100mg.    Review of side effects from Emgality was positive for bruising at the injections site. She does bruise easily as well and has low iron so attributes it to that. I do not see recent labs, but a historical lab from 1/20/2021 confirming low iron binding capacity and saturation index. Notably at that time hemoglobin was normal as it is today.     She has some nausea and that is not associated with Rizatriptan from a temporal observation. Would not be expected to be a side effect of Emgality, but discussed that it can be from migraine. Would consider that if no other alternate explanations are found on evaluation by her PCP as she describes this as being more persistent than her prior migraine attacks would have accounted for.     Will see her in return in 6 months in person.    Total time today was 32 minutes.      Again, thank you for allowing me to participate in the care of your patient.        Sincerely,        Alisha Nelson MD

## 2024-07-05 NOTE — NURSING NOTE
Is the patient currently in the state of [place]? YES    Visit mode:VIDEO    If the visit is dropped, the patient can be reconnected by: VIDEO VISIT: Send to e-mail at: [email or web address]    Will anyone else be joining the visit? NO  (If patient encounters technical issues they should call [phone]    How would you like to obtain your AVS? MyChart    Are changes needed to the allergy or medication list? No    Are refills needed on medications prescribed by this physician? NO    Reason for visit: Follow Up    Millie YBARRA

## 2024-07-05 NOTE — PROGRESS NOTES
Virtual Visit Details    Type of service:  Video Visit     Originating Location (pt. Location): Home    Distant Location (provider location):  On-site  Platform used for Video Visit: Terry    I met with Ms. Yolnada Tenorio today in return visit.     Previously, was having 30/30 days per month (persistent) headaches, 8 of which were severe as a result of chronic migraine. Associated symptoms at that time were difficulty with cognition, nausea and speech issues.    On Emgality, she has had about 2 severe headache days and about 2 rebound days. She has now completed her 3rd round of this medication. Rizatriptan has not worked well for her.   She had trialed Zolmatriptan, Sumatriptan and now Rizatriptan without benefit. Will prescribe Ubrelvy 100mg.    Review of side effects from Emgality was positive for bruising at the injections site. She does bruise easily as well and has low iron so attributes it to that. I do not see recent labs, but a historical lab from 1/20/2021 confirming low iron binding capacity and saturation index. Notably at that time hemoglobin was normal as it is today.     She has some nausea and that is not associated with Rizatriptan from a temporal observation. Would not be expected to be a side effect of Emgality, but discussed that it can be from migraine. Would consider that if no other alternate explanations are found on evaluation by her PCP as she describes this as being more persistent than her prior migraine attacks would have accounted for.     Will see her in return in 6 months in person.    Total time today was 32 minutes.

## 2024-08-05 SDOH — HEALTH STABILITY: PHYSICAL HEALTH: ON AVERAGE, HOW MANY MINUTES DO YOU ENGAGE IN EXERCISE AT THIS LEVEL?: 50 MIN

## 2024-08-05 SDOH — HEALTH STABILITY: PHYSICAL HEALTH: ON AVERAGE, HOW MANY DAYS PER WEEK DO YOU ENGAGE IN MODERATE TO STRENUOUS EXERCISE (LIKE A BRISK WALK)?: 5 DAYS

## 2024-08-05 ASSESSMENT — SOCIAL DETERMINANTS OF HEALTH (SDOH): HOW OFTEN DO YOU GET TOGETHER WITH FRIENDS OR RELATIVES?: ONCE A WEEK

## 2024-08-05 ASSESSMENT — PATIENT HEALTH QUESTIONNAIRE - PHQ9
10. IF YOU CHECKED OFF ANY PROBLEMS, HOW DIFFICULT HAVE THESE PROBLEMS MADE IT FOR YOU TO DO YOUR WORK, TAKE CARE OF THINGS AT HOME, OR GET ALONG WITH OTHER PEOPLE: SOMEWHAT DIFFICULT
SUM OF ALL RESPONSES TO PHQ QUESTIONS 1-9: 12
SUM OF ALL RESPONSES TO PHQ QUESTIONS 1-9: 12

## 2024-08-06 ENCOUNTER — OFFICE VISIT (OUTPATIENT)
Dept: FAMILY MEDICINE | Facility: CLINIC | Age: 24
End: 2024-08-06
Payer: COMMERCIAL

## 2024-08-06 VITALS
BODY MASS INDEX: 22.18 KG/M2 | SYSTOLIC BLOOD PRESSURE: 114 MMHG | TEMPERATURE: 97.9 F | OXYGEN SATURATION: 100 % | DIASTOLIC BLOOD PRESSURE: 64 MMHG | WEIGHT: 138 LBS | HEIGHT: 66 IN | RESPIRATION RATE: 12 BRPM | HEART RATE: 75 BPM

## 2024-08-06 DIAGNOSIS — Z13.0 SCREENING FOR DEFICIENCY ANEMIA: ICD-10-CM

## 2024-08-06 DIAGNOSIS — Z00.00 ROUTINE GENERAL MEDICAL EXAMINATION AT A HEALTH CARE FACILITY: Primary | ICD-10-CM

## 2024-08-06 DIAGNOSIS — Z13.29 THYROID DISORDER SCREENING: ICD-10-CM

## 2024-08-06 DIAGNOSIS — F32.0 CURRENT MILD EPISODE OF MAJOR DEPRESSIVE DISORDER WITHOUT PRIOR EPISODE (H): ICD-10-CM

## 2024-08-06 LAB
ERYTHROCYTE [DISTWIDTH] IN BLOOD BY AUTOMATED COUNT: 13 % (ref 10–15)
HCT VFR BLD AUTO: 35.4 % (ref 35–47)
HGB BLD-MCNC: 11.7 G/DL (ref 11.7–15.7)
MCH RBC QN AUTO: 29.8 PG (ref 26.5–33)
MCHC RBC AUTO-ENTMCNC: 33.1 G/DL (ref 31.5–36.5)
MCV RBC AUTO: 90 FL (ref 78–100)
PLATELET # BLD AUTO: 265 10E3/UL (ref 150–450)
RBC # BLD AUTO: 3.93 10E6/UL (ref 3.8–5.2)
WBC # BLD AUTO: 7.5 10E3/UL (ref 4–11)

## 2024-08-06 PROCEDURE — 83540 ASSAY OF IRON: CPT | Performed by: NURSE PRACTITIONER

## 2024-08-06 PROCEDURE — 83550 IRON BINDING TEST: CPT | Performed by: NURSE PRACTITIONER

## 2024-08-06 PROCEDURE — 82728 ASSAY OF FERRITIN: CPT | Performed by: NURSE PRACTITIONER

## 2024-08-06 PROCEDURE — 99395 PREV VISIT EST AGE 18-39: CPT | Performed by: NURSE PRACTITIONER

## 2024-08-06 PROCEDURE — 85027 COMPLETE CBC AUTOMATED: CPT | Performed by: NURSE PRACTITIONER

## 2024-08-06 PROCEDURE — 36415 COLL VENOUS BLD VENIPUNCTURE: CPT | Performed by: NURSE PRACTITIONER

## 2024-08-06 PROCEDURE — 84443 ASSAY THYROID STIM HORMONE: CPT | Performed by: NURSE PRACTITIONER

## 2024-08-06 NOTE — PROGRESS NOTES
Preventive Care Visit  [facility] [place]  Renetta Billings CNP, Nurse Practitioner - Family  Aug 6, 2024      Assessment & Plan     Routine general medical examination at a health care facility    Screening for deficiency anemia  A bit of fatigue. Sleep study ordered by neurology. Recommend below labs for screening.  - CBC with platelets  - Ferritin  - Iron and iron binding capacity  - CBC with platelets  - Ferritin  - Iron and iron binding capacity    Thyroid disorder screening  - TSH with free T4 reflex  - TSH with free T4 reflex    Current mild episode of major depressive disorder without prior episode (H24)  Stable, follows therapist.       Patient has been advised of split billing requirements and indicates understanding: No        Counseling  Appropriate preventive services were addressed with this patient via screening, questionnaire, or discussion as appropriate for fall prevention, nutrition, physical activity, Tobacco-use cessation, weight loss and cognition.  Checklist reviewing preventive services available has been given to the patient.  Reviewed patient's diet, addressing concerns and/or questions.   The patient was instructed to see the dentist every 6 months.   The patient's PHQ-9 score is consistent with moderate depression. She was provided with information regarding depression.       See Patient Instructions    Smith Guerrero is a 23 year old, presenting for the following:  Physical        8/6/2024     1:59 PM   Additional Questions   Roomed by Jazmín BARTLETT        Health Care Directive  Patient does not have a Health Care Directive or Living Will: Discussed advance care planning with patient; however, patient declined at this time.    HPI              8/5/2024   General Health   How would you rate your overall physical health? Good   Feel stress (tense, anxious, or unable to sleep) To some extent      (!) STRESS CONCERN      8/5/2024   Nutrition   Three or more servings of calcium  each day? Yes   Diet: Gluten-free/reduced   How many servings of fruit and vegetables per day? (!) 2-3   How many sweetened beverages each day? 0-1            8/5/2024   Exercise   Days per week of moderate/strenous exercise 5 days   Average minutes spent exercising at this level 50 min            8/5/2024   Social Factors   Frequency of gathering with friends or relatives Once a week   Worry food won't last until get money to buy more No   Food not last or not have enough money for food? No   Do you have housing? (Housing is defined as stable permanent housing and does not include staying ouside in a car, in a tent, in an abandoned building, in an overnight shelter, or couch-surfing.) No   Are you worried about losing your housing? No   Lack of transportation? No   Unable to get utilities (heat,electricity)? No   Want help with housing or utility concern? No      (!) HOUSING CONCERN PRESENT      8/5/2024   Dental   Dentist two times every year? (!) NO            8/5/2024   TB Screening   Were you born outside of the [place]? No          Today's PHQ-9 Score:       8/5/2024     7:59 PM   PHQ-9 SCORE   PHQ-9 Total Score MyChart 12 (Moderate depression)   PHQ-9 Total Score 12         8/5/2024   Substance Use   Alcohol more than 3/day or more than 7/wk No   Do you use any other substances recreationally? No        Social History     Tobacco Use    Smoking status: Never    Smokeless tobacco: Never   Vaping Use    Vaping status: Never Used   Substance Use Topics    Alcohol use: Yes    Drug use: No           8/5/2024   STI Screening   New sexual partner(s) since last STI/HIV test? (!) YES         History of abnormal Pap smear: No - age 21-29 PAP every 3 years recommended        1/10/2022     3:35 PM   PAP / HPV   PAP Negative for Intraepithelial Lesion or Malignancy (NILM)            8/5/2024   Contraception/Family Planning   Questions about contraception or family planning No           Reviewed and updated as needed this visit  "by Provider                    Past Medical History:   Diagnosis Date    Depressive disorder January 2020     History reviewed. No pertinent surgical history.      Review of Systems  Constitutional, HEENT, cardiovascular, pulmonary, GI, , musculoskeletal, neuro, skin, endocrine and psych systems are negative, except as otherwise noted.     Objective    Exam  /64 (Cuff Size: Adult Regular)   Pulse 75   Temp 97.9  F (36.6  C) (Tympanic)   Resp 12   Ht 1.676 m (5' 6\")   Wt 62.6 kg (138 lb)   LMP 07/16/2024 (Approximate)   SpO2 100%   BMI 22.27 kg/m     Estimated body mass index is 22.27 kg/m  as calculated from the following:    Height as of this encounter: 1.676 m (5' 6\").    Weight as of this encounter: 62.6 kg (138 lb).    Physical Exam  GENERAL: alert and no distress  EYES: Eyes grossly normal to inspection, PERRL and conjunctivae and sclerae normal  HENT: ear canals and TM's normal, nose and mouth without ulcers or lesions  NECK: no adenopathy, no asymmetry, masses, or scars  RESP: lungs clear to auscultation - no rales, rhonchi or wheezes  BREAST: normal without masses, tenderness or nipple discharge and no palpable axillary masses or adenopathy  CV: regular rate and rhythm, normal S1 S2, no S3 or S4, no murmur, click or rub, no peripheral edema  ABDOMEN: soft, nontender, no hepatosplenomegaly, no masses and bowel sounds normal  MS: no gross musculoskeletal defects noted, no edema  SKIN: no suspicious lesions or rashes  NEURO: Normal strength and tone, mentation intact and speech normal  PSYCH: mentation appears normal, affect normal/bright        Signed Electronically by: Renetta Billings CNP    Answers submitted by the patient for this visit:  Patient Health Questionnaire (Submitted on 8/5/2024)  If you checked off any problems, how difficult have these problems made it for you to do your work, take care of things at home, or get along with other people?: Somewhat difficult  PHQ9 TOTAL SCORE: 12    "

## 2024-08-07 LAB
FERRITIN SERPL-MCNC: 39 NG/ML (ref 6–175)
IRON BINDING CAPACITY (ROCHE): 339 UG/DL (ref 240–430)
IRON SATN MFR SERPL: 27 % (ref 15–46)
IRON SERPL-MCNC: 91 UG/DL (ref 37–145)
TSH SERPL DL<=0.005 MIU/L-ACNC: 1.47 UIU/ML (ref 0.3–4.2)

## 2025-01-23 ENCOUNTER — TELEPHONE (OUTPATIENT)
Dept: FAMILY MEDICINE | Facility: CLINIC | Age: 25
End: 2025-01-23
Payer: COMMERCIAL

## 2025-01-23 ENCOUNTER — MYC MEDICAL ADVICE (OUTPATIENT)
Dept: NEUROLOGY | Facility: CLINIC | Age: 25
End: 2025-01-23
Payer: COMMERCIAL

## 2025-01-23 NOTE — TELEPHONE ENCOUNTER
Forms/Letter Request    Type of form/letter: Medical opinion:  pt is asking for a letter   To provide clearence letter that specifies ok to use stimulants.  Do we have the form/letter: Yes: given to Renetta Billings    Who is the form from? Patient    Where did/will the form come from? form was faxed in    When is form/letter needed by: when available    How would you like the form/letter returned: N/A    Will have to call patient how does she want the letter back.    This letter that was given to Renetta Billings was found in Media only as her Neurologist sent it there.

## 2025-01-23 NOTE — LETTER
January 27, 2025      Yolanda Tenorio  [address] [place] [address]        To Whom It May Concern:    Yolanda Tenorio was seen in our clinic.    Based on review of her medical record I would agree she has no contraindication to using stimulant medication. OK to use stimulants if considering for treatment.     Sincerely,          EAN Blankenship     [address] [address]  [email or web address]   Office: [phone]              Electronically signed

## 2025-01-23 NOTE — LETTER
1/23/2025      Yolanda Tenorio  [address] [place] [address]                To Whom This May Concern,          I reviewed the form and these medications (stimulants) can increase migraine frequency and/or severity as a potential side effect. As with any medication, we do not know if you will experience that as a side effect, but those risks are typically weighed with the prescribing provider for the reason they are prescribing the medication. If up to me, I would be concerned that these medications would increase your migraine exacerbations, but if you need these medications for another disorder impacting your ability to function that just needs to be part of the decision you make with that prescribing provider to proceed with them. I recommend having a plan to stop the medication or adjust dosing if you do encounter migraine worsening as a side effect.           Sincerely,  Alisha Nelson      Electronically signed

## 2025-01-29 NOTE — TELEPHONE ENCOUNTER
Deangelo Wood,  Here is my response. If she needs it in letter form you can send it that way.    Deangelo Guerrero,  I reviewed the form and these medications (stimulants) can increase migraine frequency and/or severity as a potential side effect. As with any medication, we do not know if you will experience that as a side effect, but those risks are typically weighed with the prescribing provider for the reason they are prescribing the medication. If up to me, I would be concerned that these medications would increase your migraine exacerbations, but if you need these medications for another disorder impacting your ability to function that just needs to be part of the decision you make with that prescribing provider to proceed with them. I recommend having a plan to stop the medication or adjust dosing if you do encounter migraine worsening as a side effect.    Sincerely,  Alisha Nelson

## 2025-01-29 NOTE — TELEPHONE ENCOUNTER
Formulated letter per provider requested and sent to patient via [employer].    Nina Arriaza MA  [facility]

## 2025-02-10 DIAGNOSIS — G43.109 MIGRAINE WITH AURA AND WITHOUT STATUS MIGRAINOSUS, NOT INTRACTABLE: ICD-10-CM

## 2025-02-10 RX ORDER — GALCANEZUMAB 120 MG/ML
120 INJECTION, SOLUTION SUBCUTANEOUS
Qty: 1 ML | Refills: 6 | Status: SHIPPED | OUTPATIENT
Start: 2025-02-10

## 2025-02-10 NOTE — TELEPHONE ENCOUNTER
Refill request for the following medication (s) listed below.    Pending Prescriptions:                       Disp   Refills    EMGALITY 120 MG/ML injection [Pharmacy Me*       6            Sig: INJECT 1 ML (120 MG) SUBCUTANEOUS EVERY 28 DAYS      Last office visit provider:  7/5/24  Next appointment scheduled: 4/3/25      Medication T'd for review and signature  Andrea FARRIS ATC on 2/10/2025 at 4:47 PM

## 2025-05-27 ENCOUNTER — TELEPHONE (OUTPATIENT)
Dept: NEUROLOGY | Facility: CLINIC | Age: 25
End: 2025-05-27

## 2025-05-27 ENCOUNTER — OFFICE VISIT (OUTPATIENT)
Dept: NEUROLOGY | Facility: CLINIC | Age: 25
End: 2025-05-27
Payer: COMMERCIAL

## 2025-05-27 VITALS
OXYGEN SATURATION: 100 % | DIASTOLIC BLOOD PRESSURE: 86 MMHG | WEIGHT: 145 LBS | BODY MASS INDEX: 23.3 KG/M2 | SYSTOLIC BLOOD PRESSURE: 135 MMHG | HEART RATE: 76 BPM | HEIGHT: 66 IN

## 2025-05-27 DIAGNOSIS — G43.109 MIGRAINE WITH AURA AND WITHOUT STATUS MIGRAINOSUS, NOT INTRACTABLE: Primary | ICD-10-CM

## 2025-05-27 RX ORDER — ERENUMAB-AOOE 70 MG/ML
70 INJECTION SUBCUTANEOUS
Qty: 1 ML | Refills: 6 | Status: SHIPPED | OUTPATIENT
Start: 2025-05-27

## 2025-05-27 RX ORDER — ATOMOXETINE 40 MG/1
CAPSULE ORAL
COMMUNITY
Start: 2025-05-08

## 2025-05-27 ASSESSMENT — HEADACHE IMPACT TEST (HIT 6)
WHEN YOU HAVE A HEADACHE HOW OFTEN DO YOU WISH YOU COULD LIE DOWN: ALWAYS
HOW OFTEN DO HEADACHES LIMIT YOUR DAILY ACTIVITIES: VERY OFTEN
HOW OFTEN DID HEADACHS LIMIT CONCENTRATION ON WORK OR DAILY ACTIVITY: SOMETIMES
WHEN YOU HAVE HEADACHES HOW OFTEN IS THE PAIN SEVERE: VERY OFTEN
HOW OFTEN HAVE YOU FELT TOO TIRED TO WORK BECAUSE OF YOUR HEADACHES: SOMETIMES
HIT6 TOTAL SCORE: 65
HOW OFTEN HAVE YOU FELT FED UP OR IRRITATED BECAUSE OF YOUR HEADACHES: SOMETIMES

## 2025-05-27 ASSESSMENT — MIGRAINE DISABILITY ASSESSMENT (MIDAS)
HOW MANY DAYS DID YOU NOT DO HOUSEWORK BECAUSE OF HEADACHES: 8
HOW MANY DAYS IN THE PAST 3 MONTHS HAVE YOU HAD A HEADACHE: 15
HOW MANY DAYS WAS YOUR PRODUCTIVITY CUT IN HALF BECAUSE OF HEADACHES: 9
ON A SCALE FROM 0-10 ON AVERAGE HOW PAINFUL WERE HEADACHES: 5
HOW MANY DAYS WAS HOUSEWORK PRODUCTIVITY CUT IN HALF DUE TO HEADACHES: 12
HOW MANY DAYS DID YOU MISS WORK OR SCHOOL BECAUSE OF HEADACHES: 0
HOW OFTEN WERE SOCIAL ACTIVITIES MISSED DUE TO HEADACHES: 2
TOTAL SCORE: 31

## 2025-05-27 NOTE — LETTER
5/27/2025      Yolanda Tenorio  [address] [place] [address]      Dear Colleague,    Thank you for referring your patient, Yolanda Tenorio, to the [facility]. Please see a copy of my visit note below.    [facility]    Headache Neurology Progress Note  May 27, 2025    Assessment/Plan:   Yolanda Tenorio is a 24 year old female who return for evaluation of migraine. She had a gradually worsening wearing off of Emgality so will switch her preventative today and Ubrelvy was not effective so will adjust acute medications as well.     Headache treatment plan:  Acute medication recommendations:  Will stop Ubrelvy and start Nurtec 75mg dissolving tablet - 8 days per month as needed    Preventative medication recommendations:  Stop Emgality   Start Aimovig 70mg subcutaneous injection every 30 days  Trial for 6 months, will meet in 6 months to determine if increase is needed    Will see her in return in 6 months to evaluate benefit from these changes.     The longitudinal plan of care for Yolanda was addressed during this visit. Due to the added complexity in care, I will continue to support Yolanda in the subsequent management of this condition(s) and with the ongoing continuity of care of this condition(s).      Alisha Nelson MD  Neurology     Subjective:    Yolanda Tenorio returns for follow up of migraine.     During the first 2 weeks she has 1 severe headache per week. She will have a total of 3 headaches in 1 week.  During the last 2 weeks, she has a consistent headache and about 2-3 days per week of severe headache, when she will take an acute medication.   She had wearing off starting early starting in about the End of Sept or October (would have been about 6 months after starting).   Ubrelvy does not really help, and will help minimally, but she appreciates the benefit she does have.     She has trialed topiramate, atenolol without benefit. Rizatriptan has not worked well  "for her.   She had trialed Zolmatriptan, Sumatriptan and now Rizatriptan without benefit.    She has been on Emgality, and now with early wearing off period on this medication.     Objective:    Vitals: /86   Pulse 76   Ht 1.676 m (5' 6\")   Wt 65.8 kg (145 lb)   SpO2 100%   BMI 23.40 kg/m    General: Cooperative, NAD  Neurologic:  Mental Status: Fully alert, attentive and oriented. Speech clear and fluent.   Cranial Nerves: Facial movements symmetric.   Motor: No abnormal movements.      Pertinent Investigations:          3/19/2024     7:46 AM 7/4/2024     1:02 PM 5/27/2025     8:27 AM   HIT-6   When you have headaches, how often is the pain severe 11 10 11   How often do headaches limit your ability to do usual daily activities including household work, work, school, or social activities? 11 10 11   When you have a headache, how often do you wish you could lie down? 13 11 13   In the past 4 weeks, how often have you felt too tired to do work or daily activities because of your headaches 11 8 10   In the past 4 weeks, how often have you felt fed up or irritated because of your headaches 11 8 10   In the past 4 weeks, how often did headaches limit your ability to concentrate on work or daily activities 11 8 10   HIT-6 Total Score 68 55 65        Patient-reported           3/19/2024     7:48 AM 7/4/2024     1:04 PM 5/27/2025     8:30 AM   MIDAS - in the past three months:   On how many days did you miss work or school because of your headaches? 0 0 0   How many days was your productivity at work or school reduced by half or more because of your headaches? 60 10 9   On how many days did you not do household work because of your headaches? 40 10 8   How many days was your productivity in household work reduced by half or more because of your headaches? 50 10 12   On how many days did you miss family, social, or leisure activities because of your headaches? 5 10 2   On how many days did you have a headache? 70 " 15 15   On a scale of 0-10, on average how painful were these headaches? 4 4 5   MIDAS Score 155 (IV - Severe Disability) 40 (IV - Severe Disability) 31 (IV - Severe Disability)           Again, thank you for allowing me to participate in the care of your patient.        Sincerely,        Alisha Nelson MD    Electronically signed

## 2025-05-27 NOTE — PROGRESS NOTES
[facility]    Headache Neurology Progress Note  May 27, 2025    Assessment/Plan:   Yolanda Tenorio is a 24 year old female who return for evaluation of migraine. She had a gradually worsening wearing off of Emgality so will switch her preventative today and Ubrelvy was not effective so will adjust acute medications as well.     Headache treatment plan:  Acute medication recommendations:  Will stop Ubrelvy and start Nurtec 75mg dissolving tablet - 8 days per month as needed    Preventative medication recommendations:  Stop Emgality   Start Aimovig 70mg subcutaneous injection every 30 days  Trial for 6 months, will meet in 6 months to determine if increase is needed    Will see her in return in 6 months to evaluate benefit from these changes.     The longitudinal plan of care for Yolanda was addressed during this visit. Due to the added complexity in care, I will continue to support Yolanda in the subsequent management of this condition(s) and with the ongoing continuity of care of this condition(s).      Alisha Nelson MD  Neurology     Subjective:    Yolanda Tenorio returns for follow up of migraine.     During the first 2 weeks she has 1 severe headache per week. She will have a total of 3 headaches in 1 week.  During the last 2 weeks, she has a consistent headache and about 2-3 days per week of severe headache, when she will take an acute medication.   She had wearing off starting early starting in about the End of Sept or October (would have been about 6 months after starting).   Ubrelvy does not really help, and will help minimally, but she appreciates the benefit she does have.     She has trialed topiramate, atenolol without benefit. Rizatriptan has not worked well for her.   She had trialed Zolmatriptan, Sumatriptan and now Rizatriptan without benefit.    She has been on Emgality, and now with early wearing off period on this medication.     Objective:    Vitals: /86   Pulse 76   Ht  "1.676 m (5' 6\")   Wt 65.8 kg (145 lb)   SpO2 100%   BMI 23.40 kg/m    General: Cooperative, NAD  Neurologic:  Mental Status: Fully alert, attentive and oriented. Speech clear and fluent.   Cranial Nerves: Facial movements symmetric.   Motor: No abnormal movements.      Pertinent Investigations:          3/19/2024     7:46 AM 7/4/2024     1:02 PM 5/27/2025     8:27 AM   HIT-6   When you have headaches, how often is the pain severe 11 10 11   How often do headaches limit your ability to do usual daily activities including household work, work, school, or social activities? 11 10 11   When you have a headache, how often do you wish you could lie down? 13 11 13   In the past 4 weeks, how often have you felt too tired to do work or daily activities because of your headaches 11 8 10   In the past 4 weeks, how often have you felt fed up or irritated because of your headaches 11 8 10   In the past 4 weeks, how often did headaches limit your ability to concentrate on work or daily activities 11 8 10   HIT-6 Total Score 68 55 65        Patient-reported           3/19/2024     7:48 AM 7/4/2024     1:04 PM 5/27/2025     8:30 AM   MIDAS - in the past three months:   On how many days did you miss work or school because of your headaches? 0 0 0   How many days was your productivity at work or school reduced by half or more because of your headaches? 60 10 9   On how many days did you not do household work because of your headaches? 40 10 8   How many days was your productivity in household work reduced by half or more because of your headaches? 50 10 12   On how many days did you miss family, social, or leisure activities because of your headaches? 5 10 2   On how many days did you have a headache? 70 15 15   On a scale of 0-10, on average how painful were these headaches? 4 4 5   MIDAS Score 155 (IV - Severe Disability) 40 (IV - Severe Disability) 31 (IV - Severe Disability)         "

## 2025-05-27 NOTE — NURSING NOTE
"Yolanda Tenorio is a 24 year old female who presents for:  Chief Complaint   Patient presents with    Headache     Follow up headache      Patient notes a little relief post injection for 2 weeks then it comes right back. Has not shifted negatively.        Initial Vitals:  /86   Pulse 76   Ht 1.676 m (5' 6\")   Wt 65.8 kg (145 lb)   SpO2 100%   BMI 23.40 kg/m   Estimated body mass index is 23.4 kg/m  as calculated from the following:    Height as of this encounter: 1.676 m (5' 6\").    Weight as of this encounter: 65.8 kg (145 lb).. Body surface area is 1.75 meters squared. BP completed using cuff size: rangel Mijares CMA    "

## 2025-05-27 NOTE — TELEPHONE ENCOUNTER
Prior Authorization Retail Medication Request    Medication/Dose: rimegepant (NURTEC) 75 MG ODT tablet     Diagnosis and ICD code (if different than what is on RX):    New/renewal/insurance change PA/secondary ins. PA:  Previously Tried and Failed:    Rationale:       Insurance   Primary:   [employer]      Insurance ID:  [id number]      Secondary (if applicable):  Insurance ID:       Pharmacy Information (if different than what is on RX)  Name:    [address]      Phone:  [phone]   Fax:[phone]      Clinic Information  Preferred routing pool for dept communication:  Neurology [facility] Pool

## 2025-07-14 ENCOUNTER — PATIENT OUTREACH (OUTPATIENT)
Dept: CARE COORDINATION | Facility: CLINIC | Age: 25
End: 2025-07-14
Payer: COMMERCIAL

## 2025-07-30 ENCOUNTER — MYC MEDICAL ADVICE (OUTPATIENT)
Dept: FAMILY MEDICINE | Facility: CLINIC | Age: 25
End: 2025-07-30
Payer: COMMERCIAL

## 2025-07-31 NOTE — TELEPHONE ENCOUNTER
Are you able to confirm at an office visit? And discuss next steps? Or must be through OBGYN?    Routing to provider to review and advise.     Leonila Buckner RN   [facility]

## 2025-07-31 NOTE — TELEPHONE ENCOUNTER
Attempt #1    Left message to call back. When patient calls back please help schedule appointment with PCP

## 2025-07-31 NOTE — TELEPHONE ENCOUNTER
Please help her schedule pregnancy confirmation office visit with me. Any available slot is ok    Renetta Billings, CNP

## 2025-08-04 ENCOUNTER — OFFICE VISIT (OUTPATIENT)
Dept: FAMILY MEDICINE | Facility: CLINIC | Age: 25
End: 2025-08-04
Payer: COMMERCIAL

## 2025-08-04 VITALS
TEMPERATURE: 98.2 F | WEIGHT: 160 LBS | DIASTOLIC BLOOD PRESSURE: 72 MMHG | HEART RATE: 97 BPM | HEIGHT: 66 IN | SYSTOLIC BLOOD PRESSURE: 112 MMHG | BODY MASS INDEX: 25.71 KG/M2 | OXYGEN SATURATION: 100 % | RESPIRATION RATE: 14 BRPM

## 2025-08-04 DIAGNOSIS — N91.2 ABSENCE OF MENSTRUATION: ICD-10-CM

## 2025-08-04 DIAGNOSIS — Z32.01 PREGNANCY TEST POSITIVE: Primary | ICD-10-CM

## 2025-08-04 LAB — HCG UR QL: POSITIVE

## 2025-08-04 PROCEDURE — 3078F DIAST BP <80 MM HG: CPT | Performed by: NURSE PRACTITIONER

## 2025-08-04 PROCEDURE — 99213 OFFICE O/P EST LOW 20 MIN: CPT | Performed by: NURSE PRACTITIONER

## 2025-08-04 PROCEDURE — 81025 URINE PREGNANCY TEST: CPT | Performed by: NURSE PRACTITIONER

## 2025-08-04 PROCEDURE — 3074F SYST BP LT 130 MM HG: CPT | Performed by: NURSE PRACTITIONER

## 2025-08-04 PROCEDURE — 1126F AMNT PAIN NOTED NONE PRSNT: CPT | Performed by: NURSE PRACTITIONER

## 2025-08-04 ASSESSMENT — PAIN SCALES - GENERAL: PAINLEVEL_OUTOF10: NO PAIN (0)

## 2025-08-07 ENCOUNTER — MYC MEDICAL ADVICE (OUTPATIENT)
Dept: FAMILY MEDICINE | Facility: CLINIC | Age: 25
End: 2025-08-07
Payer: COMMERCIAL

## 2025-08-14 ENCOUNTER — ANCILLARY PROCEDURE (OUTPATIENT)
Dept: ULTRASOUND IMAGING | Facility: CLINIC | Age: 25
End: 2025-08-14
Attending: NURSE PRACTITIONER
Payer: COMMERCIAL

## 2025-08-14 DIAGNOSIS — Z32.01 PREGNANCY TEST POSITIVE: ICD-10-CM

## 2025-08-14 PROCEDURE — 76817 TRANSVAGINAL US OBSTETRIC: CPT | Performed by: FAMILY MEDICINE

## 2025-08-14 PROCEDURE — 76801 OB US < 14 WKS SINGLE FETUS: CPT | Performed by: FAMILY MEDICINE
